# Patient Record
Sex: FEMALE | Race: ASIAN | NOT HISPANIC OR LATINO | URBAN - METROPOLITAN AREA
[De-identification: names, ages, dates, MRNs, and addresses within clinical notes are randomized per-mention and may not be internally consistent; named-entity substitution may affect disease eponyms.]

---

## 2019-03-23 ENCOUNTER — EMERGENCY (EMERGENCY)
Facility: HOSPITAL | Age: 59
LOS: 1 days | Discharge: HOME | End: 2019-03-23
Attending: EMERGENCY MEDICINE | Admitting: EMERGENCY MEDICINE
Payer: MEDICAID

## 2019-03-23 VITALS
RESPIRATION RATE: 16 BRPM | HEART RATE: 69 BPM | SYSTOLIC BLOOD PRESSURE: 143 MMHG | OXYGEN SATURATION: 99 % | DIASTOLIC BLOOD PRESSURE: 94 MMHG

## 2019-03-23 VITALS
DIASTOLIC BLOOD PRESSURE: 92 MMHG | HEART RATE: 70 BPM | RESPIRATION RATE: 18 BRPM | SYSTOLIC BLOOD PRESSURE: 146 MMHG | OXYGEN SATURATION: 99 % | TEMPERATURE: 98 F

## 2019-03-23 LAB
ALBUMIN SERPL ELPH-MCNC: 4.2 G/DL — SIGNIFICANT CHANGE UP (ref 3.3–5)
ALP SERPL-CCNC: 103 U/L — SIGNIFICANT CHANGE UP (ref 40–120)
ALT FLD-CCNC: 25 U/L — SIGNIFICANT CHANGE UP (ref 4–33)
ANION GAP SERPL CALC-SCNC: 11 MMO/L — SIGNIFICANT CHANGE UP (ref 7–14)
AST SERPL-CCNC: 36 U/L — HIGH (ref 4–32)
BASOPHILS # BLD AUTO: 0.05 K/UL — SIGNIFICANT CHANGE UP (ref 0–0.2)
BASOPHILS NFR BLD AUTO: 0.6 % — SIGNIFICANT CHANGE UP (ref 0–2)
BILIRUB SERPL-MCNC: 0.4 MG/DL — SIGNIFICANT CHANGE UP (ref 0.2–1.2)
BUN SERPL-MCNC: 9 MG/DL — SIGNIFICANT CHANGE UP (ref 7–23)
CALCIUM SERPL-MCNC: 11.3 MG/DL — HIGH (ref 8.4–10.5)
CHLORIDE SERPL-SCNC: 105 MMOL/L — SIGNIFICANT CHANGE UP (ref 98–107)
CO2 SERPL-SCNC: 21 MMOL/L — LOW (ref 22–31)
CREAT SERPL-MCNC: 0.48 MG/DL — LOW (ref 0.5–1.3)
EOSINOPHIL # BLD AUTO: 0.06 K/UL — SIGNIFICANT CHANGE UP (ref 0–0.5)
EOSINOPHIL NFR BLD AUTO: 0.7 % — SIGNIFICANT CHANGE UP (ref 0–6)
GLUCOSE SERPL-MCNC: 98 MG/DL — SIGNIFICANT CHANGE UP (ref 70–99)
HCT VFR BLD CALC: 42.1 % — SIGNIFICANT CHANGE UP (ref 34.5–45)
HGB BLD-MCNC: 14.1 G/DL — SIGNIFICANT CHANGE UP (ref 11.5–15.5)
IMM GRANULOCYTES NFR BLD AUTO: 0.2 % — SIGNIFICANT CHANGE UP (ref 0–1.5)
LIDOCAIN IGE QN: 14.6 U/L — SIGNIFICANT CHANGE UP (ref 7–60)
LYMPHOCYTES # BLD AUTO: 1.67 K/UL — SIGNIFICANT CHANGE UP (ref 1–3.3)
LYMPHOCYTES # BLD AUTO: 18.9 % — SIGNIFICANT CHANGE UP (ref 13–44)
MCHC RBC-ENTMCNC: 27.1 PG — SIGNIFICANT CHANGE UP (ref 27–34)
MCHC RBC-ENTMCNC: 33.5 % — SIGNIFICANT CHANGE UP (ref 32–36)
MCV RBC AUTO: 81 FL — SIGNIFICANT CHANGE UP (ref 80–100)
MONOCYTES # BLD AUTO: 0.33 K/UL — SIGNIFICANT CHANGE UP (ref 0–0.9)
MONOCYTES NFR BLD AUTO: 3.7 % — SIGNIFICANT CHANGE UP (ref 2–14)
NEUTROPHILS # BLD AUTO: 6.69 K/UL — SIGNIFICANT CHANGE UP (ref 1.8–7.4)
NEUTROPHILS NFR BLD AUTO: 75.9 % — SIGNIFICANT CHANGE UP (ref 43–77)
NRBC # FLD: 0 K/UL — LOW (ref 25–125)
PLATELET # BLD AUTO: 391 K/UL — SIGNIFICANT CHANGE UP (ref 150–400)
PMV BLD: 10.8 FL — SIGNIFICANT CHANGE UP (ref 7–13)
POTASSIUM SERPL-MCNC: 5 MMOL/L — SIGNIFICANT CHANGE UP (ref 3.5–5.3)
POTASSIUM SERPL-SCNC: 5 MMOL/L — SIGNIFICANT CHANGE UP (ref 3.5–5.3)
PROT SERPL-MCNC: 8.4 G/DL — HIGH (ref 6–8.3)
RBC # BLD: 5.2 M/UL — SIGNIFICANT CHANGE UP (ref 3.8–5.2)
RBC # FLD: 13.3 % — SIGNIFICANT CHANGE UP (ref 10.3–14.5)
SODIUM SERPL-SCNC: 137 MMOL/L — SIGNIFICANT CHANGE UP (ref 135–145)
TROPONIN T, HIGH SENSITIVITY: < 6 NG/L — SIGNIFICANT CHANGE UP (ref ?–14)
TSH SERPL-MCNC: 0.25 UIU/ML — LOW (ref 0.27–4.2)
WBC # BLD: 8.82 K/UL — SIGNIFICANT CHANGE UP (ref 3.8–10.5)
WBC # FLD AUTO: 8.82 K/UL — SIGNIFICANT CHANGE UP (ref 3.8–10.5)

## 2019-03-23 PROCEDURE — 93010 ELECTROCARDIOGRAM REPORT: CPT

## 2019-03-23 PROCEDURE — 99284 EMERGENCY DEPT VISIT MOD MDM: CPT | Mod: 25

## 2019-03-23 RX ORDER — MECLIZINE HCL 12.5 MG
25 TABLET ORAL ONCE
Qty: 0 | Refills: 0 | Status: COMPLETED | OUTPATIENT
Start: 2019-03-23 | End: 2019-03-23

## 2019-03-23 RX ORDER — METOCLOPRAMIDE HCL 10 MG
1 TABLET ORAL
Qty: 16 | Refills: 0 | OUTPATIENT
Start: 2019-03-23 | End: 2019-03-26

## 2019-03-23 RX ORDER — METOCLOPRAMIDE HCL 10 MG
10 TABLET ORAL ONCE
Qty: 0 | Refills: 0 | Status: COMPLETED | OUTPATIENT
Start: 2019-03-23 | End: 2019-03-23

## 2019-03-23 RX ORDER — SODIUM CHLORIDE 9 MG/ML
2000 INJECTION INTRAMUSCULAR; INTRAVENOUS; SUBCUTANEOUS ONCE
Qty: 0 | Refills: 0 | Status: COMPLETED | OUTPATIENT
Start: 2019-03-23 | End: 2019-03-23

## 2019-03-23 RX ORDER — MECLIZINE HCL 12.5 MG
12.5 TABLET ORAL ONCE
Qty: 0 | Refills: 0 | Status: DISCONTINUED | OUTPATIENT
Start: 2019-03-23 | End: 2019-03-23

## 2019-03-23 RX ADMIN — Medication 10 MILLIGRAM(S): at 18:40

## 2019-03-23 RX ADMIN — SODIUM CHLORIDE 2000 MILLILITER(S): 9 INJECTION INTRAMUSCULAR; INTRAVENOUS; SUBCUTANEOUS at 18:41

## 2019-03-23 RX ADMIN — Medication 25 MILLIGRAM(S): at 18:40

## 2019-03-23 NOTE — ED PROVIDER NOTE - CLINICAL SUMMARY MEDICAL DECISION MAKING FREE TEXT BOX
60 Y/O F denies PMH woke up with room spinning dizz today, denies trauma, pt neurologically intact on exam. Plan is labs, fluid, antivert and reglan and reassessment. Pt is stable and well appearing. 60 Y/O F denies PMH woke up with room spinning dizz today, denies trauma, pt neurologically intact on exam. Plan is labs, fluid, antivert and reglan and reassessment. Pt is stable and well appearing. Pt has exophthalmus on exam, will give endocrine f/u for possible graves disease.

## 2019-03-23 NOTE — ED PROVIDER NOTE - PROGRESS NOTE DETAILS
DESIRAE Bojorquez: Pt reassessed, able to ambulate with a steady gait, states she feels better pt told to see pmd and endocrine to check thyroid function.

## 2019-03-23 NOTE — ED PROVIDER NOTE - NSFOLLOWUPINSTRUCTIONS_ED_ALL_ED_FT
Follow up with your primary doctor, continue to take meclizine as prescribed and take reglan as needed for nausea. Some of your labs are abnormal. Bring this packet which includes your results. Your calcium is high and your thyroid function is high. You may have Graves disease, have your primary doctor do a T4 and T3 outpatient. Call  to schedule an appointment. Rest, drink plenty of fluids.  Advance activity as tolerated.  Continue all previously prescribed medications as directed.  Follow up with your primary care physician in 48-72 hours- bring copies of your results.  Return to the ER for worsening or persistent symptoms, and/or ANY NEW OR CONCERNING SYMPTOMS. If you have issues obtaining follow up, please call: [phone] to obtain a doctor or specialist who takes your insurance in your area.  You may call [phone] to make an appointment with the internal medicine clinic. Follow up with your primary doctor, continue to take meclizine as prescribed and take reglan as needed for nausea. Some of your labs are abnormal. Bring this packet which includes your results. Your calcium is high and your thyroid function is high. You may have Graves disease, have your primary doctor do a T4 and T3 outpatient. Call  to schedule an appointment with an endocrinologist. Rest, drink plenty of fluids.  Advance activity as tolerated.  Continue all previously prescribed medications as directed.  Follow up with your primary care physician in 48-72 hours- bring copies of your results.  Return to the ER for worsening or persistent symptoms, and/or ANY NEW OR CONCERNING SYMPTOMS. If you have issues obtaining follow up, please call: [phone] to obtain a doctor or specialist who takes your insurance in your area.  You may call [phone] to make an appointment with the internal medicine clinic.

## 2019-03-23 NOTE — ED PROVIDER NOTE - CRANIAL NERVE AND PUPILLARY EXAM
cranial nerves 2-12 intact/central and peripheral vision intact/tongue is midline/peripheral vision intact/extra-ocular movements intact

## 2019-03-23 NOTE — ED ADULT NURSE NOTE - OBJECTIVE STATEMENT
pt alert,oriented x3. family member at bedside to translate. pt Luxem[place]ish speaking. c/o dizziness, denies n,pain at present

## 2019-03-23 NOTE — ED PROVIDER NOTE - OBJECTIVE STATEMENT
60 Y/O F only PMH vertigo states she woke up with room spinning dizz and nausea this morning. 58 Y/O F only PMH vertigo states she woke up with room spinning dizz and nausea this morning. She states she took meclizine but vomited shortly afterward which was non bloody non billious. She denies numbness weakness tingling visual changes or inability to ambulate, denies fever chills nightsweats CP SOB ABD PN N V D DIzz Diarrhea or any other symptoms or complaints. She is selwyn speaking, was offered a  phone but declined, son in room for translation. States symptoms are worse when she moves her head.

## 2019-03-23 NOTE — ED PROVIDER NOTE - ATTENDING CONTRIBUTION TO CARE
DR. GLEZ, ATTENDING MD-  I performed a face to face bedside interview with patient regarding history of present illness, review of symptoms and past medical history. I completed an independent physical exam.  I have discussed patient's plan of care with PA.   Documentation as above in the note.    60 y/o female with h/o vertigo typically controlled with meclizine here with vertigo.  Pt states that she had dizziness this morning which is intermittent.  Severe sx.  Took meclizine she had at home which typically works but did little to alleviate her sx.  Worse with head movements or changing positions.  Denies f/c, ha, neck stiffness, cp, sob, cough, abd pain, n/v/d, dysuria, rash.  Afebrile, vs wnl, nad, perrla, eomi, no nystagmus, ctabil, s1s2 rrr no m/r/g, abd soft non ttp no r/g, no cva tenderness b/l, no leg swelling b/l, no rash, motor 5/5 x4 ext, distal sensory grossly intact x4 ext, ambulatory with assist, normal heel to shin, normal finger to nose.  Doubt central vertigo given rapidity of sx onset, severity of sx.  Attempt symptom control.  Obtain cbc bmp give ivf meclizine reassess.

## 2019-03-23 NOTE — ED PROVIDER NOTE - ENMT, MLM
Airway patent, Nasal mucosa clear. Mouth with normal mucosa. Throat has no vesicles, no oropharyngeal exudates and uvula is midline, no nystagmus.

## 2019-03-23 NOTE — ED PROVIDER NOTE - CARE PLAN
Principal Discharge DX:	Vertigo Principal Discharge DX:	Vertigo  Secondary Diagnosis:	Hyperthyroidism

## 2019-03-23 NOTE — ED ADULT TRIAGE NOTE - CHIEF COMPLAINT QUOTE
Patient has c/o dizziness that started this morning with an episode of vomiting. Pt has a history of Vertigo.

## 2021-03-09 NOTE — ED ADULT NURSE NOTE - NSSUSCREENINGQ3_ED_ALL_ED
SPINE EVALUATION:   Referring Physician: Dr. Sofie Dickey  Diagnosis: Strain of lumbar region, initial encounter (S39.012A)  Strain of trapezius muscle, unspecified laterality, initial encounter (X49.473M)     Date of Service: 3/9/2021     PATIENT SUMMARY   Rosio Bolanos BMI of 45.0-49.9    ASSESSMENT  Alona Mckeon presents to physical therapy evaluation with primary c/o back pain and neck pain with tingling into the left hand.  The results of the objective tests and measures show cervical AROM that is WNL, impaired mobility of Trap: R impaired; L impaired  Levator Scap: R impaired; L impaired     Special tests:   ULTT to be assessed at next session    Gait: pt ambulates on level ground with normal mechanics.     Today’s Treatment and Response:   Pt education was provided on exam and Home Exercise Program instruction    Education or treatment limitation: None  Rehab Potential:good    FOTO: 71/100    Patient/Family/Caregiver was advised of these findings, precautions, and treatment options and has agreed to actively participate in p No

## 2022-08-11 PROBLEM — R42 DIZZINESS AND GIDDINESS: Chronic | Status: ACTIVE | Noted: 2019-03-23

## 2022-08-11 PROBLEM — Z00.00 ENCOUNTER FOR PREVENTIVE HEALTH EXAMINATION: Status: ACTIVE | Noted: 2022-08-11

## 2022-08-12 ENCOUNTER — NON-APPOINTMENT (OUTPATIENT)
Age: 62
End: 2022-08-12

## 2022-08-12 ENCOUNTER — APPOINTMENT (OUTPATIENT)
Dept: ORTHOPEDIC SURGERY | Facility: CLINIC | Age: 62
End: 2022-08-12

## 2022-08-12 VITALS
BODY MASS INDEX: 27.97 KG/M2 | OXYGEN SATURATION: 95 % | WEIGHT: 152 LBS | DIASTOLIC BLOOD PRESSURE: 72 MMHG | HEIGHT: 62 IN | HEART RATE: 67 BPM | SYSTOLIC BLOOD PRESSURE: 134 MMHG

## 2022-08-12 PROCEDURE — 99204 OFFICE O/P NEW MOD 45 MIN: CPT | Mod: 25

## 2022-08-12 PROCEDURE — 73110 X-RAY EXAM OF WRIST: CPT | Mod: RT

## 2022-08-12 PROCEDURE — 29075 APPL CST ELBW FNGR SHORT ARM: CPT | Mod: RT

## 2022-08-12 RX ORDER — IBUPROFEN 600 MG/1
600 TABLET, FILM COATED ORAL 3 TIMES DAILY
Qty: 90 | Refills: 1 | Status: ACTIVE | COMMUNITY
Start: 2022-08-12 | End: 1900-01-01

## 2022-08-24 ENCOUNTER — APPOINTMENT (OUTPATIENT)
Dept: ORTHOPEDIC SURGERY | Facility: CLINIC | Age: 62
End: 2022-08-24

## 2022-08-24 PROCEDURE — 99213 OFFICE O/P EST LOW 20 MIN: CPT | Mod: 25

## 2022-08-24 PROCEDURE — 29075 APPL CST ELBW FNGR SHORT ARM: CPT | Mod: RT

## 2022-08-24 PROCEDURE — 73110 X-RAY EXAM OF WRIST: CPT | Mod: 26,RT

## 2022-09-12 ENCOUNTER — APPOINTMENT (OUTPATIENT)
Dept: ORTHOPEDIC SURGERY | Facility: CLINIC | Age: 62
End: 2022-09-12

## 2022-09-12 PROCEDURE — 73110 X-RAY EXAM OF WRIST: CPT | Mod: RT

## 2022-09-12 PROCEDURE — 99213 OFFICE O/P EST LOW 20 MIN: CPT

## 2022-09-23 ENCOUNTER — APPOINTMENT (OUTPATIENT)
Dept: ORTHOPEDIC SURGERY | Facility: CLINIC | Age: 62
End: 2022-09-23

## 2022-09-23 DIAGNOSIS — S52.514D NONDISPLACED FRACTURE OF RIGHT RADIAL STYLOID PROCESS, SUBSEQUENT ENCOUNTER FOR CLOSED FRACTURE WITH ROUTINE HEALING: ICD-10-CM

## 2022-09-23 PROCEDURE — 73110 X-RAY EXAM OF WRIST: CPT | Mod: RT

## 2022-09-23 PROCEDURE — 99214 OFFICE O/P EST MOD 30 MIN: CPT

## 2022-09-23 RX ORDER — MELOXICAM 15 MG/1
15 TABLET ORAL
Qty: 30 | Refills: 0 | Status: ACTIVE | COMMUNITY
Start: 2022-09-23 | End: 1900-01-01

## 2023-07-04 ENCOUNTER — EMERGENCY (EMERGENCY)
Facility: HOSPITAL | Age: 63
LOS: 1 days | Discharge: HOME | End: 2023-07-04
Attending: STUDENT IN AN ORGANIZED HEALTH CARE EDUCATION/TRAINING PROGRAM | Admitting: EMERGENCY MEDICINE
Payer: MEDICAID

## 2023-07-04 VITALS
OXYGEN SATURATION: 95 % | HEART RATE: 84 BPM | SYSTOLIC BLOOD PRESSURE: 142 MMHG | TEMPERATURE: 98 F | RESPIRATION RATE: 16 BRPM | DIASTOLIC BLOOD PRESSURE: 89 MMHG

## 2023-07-04 LAB
ALBUMIN SERPL ELPH-MCNC: 4.6 G/DL — SIGNIFICANT CHANGE UP (ref 3.3–5)
ALP SERPL-CCNC: 136 U/L — HIGH (ref 40–120)
ALT FLD-CCNC: 26 U/L — SIGNIFICANT CHANGE UP (ref 4–33)
ANION GAP SERPL CALC-SCNC: 11 MMOL/L — SIGNIFICANT CHANGE UP (ref 7–14)
APTT BLD: 34.6 SEC — SIGNIFICANT CHANGE UP (ref 27–36.3)
AST SERPL-CCNC: 27 U/L — SIGNIFICANT CHANGE UP (ref 4–32)
BASOPHILS # BLD AUTO: 0.06 K/UL — SIGNIFICANT CHANGE UP (ref 0–0.2)
BASOPHILS NFR BLD AUTO: 0.8 % — SIGNIFICANT CHANGE UP (ref 0–2)
BILIRUB SERPL-MCNC: 0.2 MG/DL — SIGNIFICANT CHANGE UP (ref 0.2–1.2)
BUN SERPL-MCNC: 11 MG/DL — SIGNIFICANT CHANGE UP (ref 7–23)
CALCIUM SERPL-MCNC: 12 MG/DL — HIGH (ref 8.4–10.5)
CHLORIDE SERPL-SCNC: 105 MMOL/L — SIGNIFICANT CHANGE UP (ref 98–107)
CO2 SERPL-SCNC: 23 MMOL/L — SIGNIFICANT CHANGE UP (ref 22–31)
CREAT SERPL-MCNC: 0.53 MG/DL — SIGNIFICANT CHANGE UP (ref 0.5–1.3)
EGFR: 103 ML/MIN/1.73M2 — SIGNIFICANT CHANGE UP
EOSINOPHIL # BLD AUTO: 0.25 K/UL — SIGNIFICANT CHANGE UP (ref 0–0.5)
EOSINOPHIL NFR BLD AUTO: 3.4 % — SIGNIFICANT CHANGE UP (ref 0–6)
GLUCOSE SERPL-MCNC: 97 MG/DL — SIGNIFICANT CHANGE UP (ref 70–99)
HCT VFR BLD CALC: 40.8 % — SIGNIFICANT CHANGE UP (ref 34.5–45)
HGB BLD-MCNC: 13.6 G/DL — SIGNIFICANT CHANGE UP (ref 11.5–15.5)
IANC: 4.19 K/UL — SIGNIFICANT CHANGE UP (ref 1.8–7.4)
IMM GRANULOCYTES NFR BLD AUTO: 0.3 % — SIGNIFICANT CHANGE UP (ref 0–0.9)
INR BLD: 1.01 RATIO — SIGNIFICANT CHANGE UP (ref 0.88–1.16)
LYMPHOCYTES # BLD AUTO: 2.32 K/UL — SIGNIFICANT CHANGE UP (ref 1–3.3)
LYMPHOCYTES # BLD AUTO: 31.8 % — SIGNIFICANT CHANGE UP (ref 13–44)
MCHC RBC-ENTMCNC: 27.6 PG — SIGNIFICANT CHANGE UP (ref 27–34)
MCHC RBC-ENTMCNC: 33.3 GM/DL — SIGNIFICANT CHANGE UP (ref 32–36)
MCV RBC AUTO: 82.8 FL — SIGNIFICANT CHANGE UP (ref 80–100)
MONOCYTES # BLD AUTO: 0.45 K/UL — SIGNIFICANT CHANGE UP (ref 0–0.9)
MONOCYTES NFR BLD AUTO: 6.2 % — SIGNIFICANT CHANGE UP (ref 2–14)
NEUTROPHILS # BLD AUTO: 4.19 K/UL — SIGNIFICANT CHANGE UP (ref 1.8–7.4)
NEUTROPHILS NFR BLD AUTO: 57.5 % — SIGNIFICANT CHANGE UP (ref 43–77)
NRBC # BLD: 0 /100 WBCS — SIGNIFICANT CHANGE UP (ref 0–0)
NRBC # FLD: 0 K/UL — SIGNIFICANT CHANGE UP (ref 0–0)
PLATELET # BLD AUTO: 366 K/UL — SIGNIFICANT CHANGE UP (ref 150–400)
POTASSIUM SERPL-MCNC: 4.3 MMOL/L — SIGNIFICANT CHANGE UP (ref 3.5–5.3)
POTASSIUM SERPL-SCNC: 4.3 MMOL/L — SIGNIFICANT CHANGE UP (ref 3.5–5.3)
PROT SERPL-MCNC: 8.7 G/DL — HIGH (ref 6–8.3)
PROTHROM AB SERPL-ACNC: 11.7 SEC — SIGNIFICANT CHANGE UP (ref 10.5–13.4)
RBC # BLD: 4.93 M/UL — SIGNIFICANT CHANGE UP (ref 3.8–5.2)
RBC # FLD: 13.2 % — SIGNIFICANT CHANGE UP (ref 10.3–14.5)
SODIUM SERPL-SCNC: 139 MMOL/L — SIGNIFICANT CHANGE UP (ref 135–145)
T4 FREE SERPL-MCNC: 1.3 NG/DL — SIGNIFICANT CHANGE UP (ref 0.9–1.8)
TROPONIN T, HIGH SENSITIVITY RESULT: <6 NG/L — SIGNIFICANT CHANGE UP
TSH SERPL-MCNC: 0.18 UIU/ML — LOW (ref 0.27–4.2)
WBC # BLD: 7.29 K/UL — SIGNIFICANT CHANGE UP (ref 3.8–10.5)
WBC # FLD AUTO: 7.29 K/UL — SIGNIFICANT CHANGE UP (ref 3.8–10.5)

## 2023-07-04 PROCEDURE — 99291 CRITICAL CARE FIRST HOUR: CPT

## 2023-07-04 PROCEDURE — 70498 CT ANGIOGRAPHY NECK: CPT | Mod: 26,MA

## 2023-07-04 PROCEDURE — 70496 CT ANGIOGRAPHY HEAD: CPT | Mod: 26,MA

## 2023-07-04 PROCEDURE — 93010 ELECTROCARDIOGRAM REPORT: CPT

## 2023-07-04 PROCEDURE — 0042T: CPT | Mod: MA

## 2023-07-04 PROCEDURE — 99223 1ST HOSP IP/OBS HIGH 75: CPT

## 2023-07-04 RX ORDER — CLOPIDOGREL BISULFATE 75 MG/1
75 TABLET, FILM COATED ORAL DAILY
Refills: 0 | Status: DISCONTINUED | OUTPATIENT
Start: 2023-07-05 | End: 2023-07-08

## 2023-07-04 RX ORDER — ASPIRIN/CALCIUM CARB/MAGNESIUM 324 MG
81 TABLET ORAL DAILY
Refills: 0 | Status: DISCONTINUED | OUTPATIENT
Start: 2023-07-05 | End: 2023-07-08

## 2023-07-04 RX ORDER — SODIUM CHLORIDE 9 MG/ML
1000 INJECTION INTRAMUSCULAR; INTRAVENOUS; SUBCUTANEOUS ONCE
Refills: 0 | Status: COMPLETED | OUTPATIENT
Start: 2023-07-04 | End: 2023-07-04

## 2023-07-04 RX ORDER — SODIUM CHLORIDE 9 MG/ML
1000 INJECTION INTRAMUSCULAR; INTRAVENOUS; SUBCUTANEOUS
Refills: 0 | Status: DISCONTINUED | OUTPATIENT
Start: 2023-07-04 | End: 2023-07-08

## 2023-07-04 RX ORDER — ATORVASTATIN CALCIUM 80 MG/1
80 TABLET, FILM COATED ORAL AT BEDTIME
Refills: 0 | Status: DISCONTINUED | OUTPATIENT
Start: 2023-07-04 | End: 2023-07-08

## 2023-07-04 RX ORDER — ASPIRIN/CALCIUM CARB/MAGNESIUM 324 MG
324 TABLET ORAL ONCE
Refills: 0 | Status: COMPLETED | OUTPATIENT
Start: 2023-07-04 | End: 2023-07-04

## 2023-07-04 RX ORDER — CLOPIDOGREL BISULFATE 75 MG/1
300 TABLET, FILM COATED ORAL ONCE
Refills: 0 | Status: COMPLETED | OUTPATIENT
Start: 2023-07-04 | End: 2023-07-04

## 2023-07-04 RX ADMIN — SODIUM CHLORIDE 1000 MILLILITER(S): 9 INJECTION INTRAMUSCULAR; INTRAVENOUS; SUBCUTANEOUS at 18:16

## 2023-07-04 RX ADMIN — SODIUM CHLORIDE 150 MILLILITER(S): 9 INJECTION INTRAMUSCULAR; INTRAVENOUS; SUBCUTANEOUS at 20:47

## 2023-07-04 RX ADMIN — ATORVASTATIN CALCIUM 80 MILLIGRAM(S): 80 TABLET, FILM COATED ORAL at 19:02

## 2023-07-04 RX ADMIN — CLOPIDOGREL BISULFATE 300 MILLIGRAM(S): 75 TABLET, FILM COATED ORAL at 19:02

## 2023-07-04 RX ADMIN — Medication 324 MILLIGRAM(S): at 19:02

## 2023-07-04 NOTE — ED ADULT TRIAGE NOTE - CHIEF COMPLAINT QUOTE
Pt. c/o right eye blurry vision starting around 4pm. Denies headache, dizziness, nausea, vomiting, weakness or eye pain. No other neuro deficits noted on exam. h/o migraines but never had blurry vision before.

## 2023-07-04 NOTE — ED PROVIDER NOTE - PROGRESS NOTE DETAILS
Juve Luna MD PGY-2  CT with c/f L occipital hypodensity per neurology team. C/F CVA w/o candidacy for tenecteplase.   - Ophtho still to see  - TTE,   - ASA load then daily  - Clopidogrel load then daily    #Hypercalcemia  Pt with hypecalcemia noted as well.    - NS 1L IVB, then IVF 150cc/hr  - PTH, PTHrP, VitD 25, VitD1,25, TSH w/ T4

## 2023-07-04 NOTE — CONSULT NOTE ADULT - ASSESSMENT
Assessment and Recommendations:  63y female with no known past medical history or ocular history consulted for evaluation of sudden onset blurry vision right eye x3 hours.     #Blurry Vision OD       Outpatient Follow-up: Patient should follow-up with his/her ophthalmologist or with [facility] Department of Ophthalmology within 1 week of after discharge at:    [address]  [place] [address] [phone]    Deepika Calvin MD PGY 3  Available on [employer] Teams Assessment and Recommendations:  63y female with no known past medical history or ocular history consulted for evaluation of sudden onset blurry vision right eye x3 hours.     #Blurry Vision OD  - Va CF PH 20/400 OD, 20/20 OS   - PERRL no rAPD  - CVF with temporal constriction OD, grossly full OS  - DFE without disc margin blurring. No retinal whitening noted. Vessels ess wnl.   - Stroke team noted hypodensity in left occipital lobe concerning for occipital stroke - symptoms fit this distribution  - GCA ROS negative, temporal pulses full - low suspicion for GCA  - Appreciate neurology care: agree with complete stroke work-up and management  - Agree with MRI brain w/ IV contrast, please add on MRI orbits w/ contrast to rule out orbital pathology - call ophthalmology if any orbital pathology    #Ocular HTN/Glc Suspect  - IOP 21 OD, 24 OS  - CDR 0.45, 0.3 - asymmetric appearing  - Iris also bowed forward  - Will need further glaucoma screening as an outpatient       Outpatient Follow-up: Patient should follow-up with his/her ophthalmologist or with [facility] Department of Ophthalmology within 1 week of after discharge at:    [address]  [place] [address]  [phone]    Deepika Calvin MD PGY 3  Available on [employer] Teams Assessment and Recommendations:  63y female with no known past medical history or ocular history consulted for evaluation of sudden onset blurry vision right eye x3 hours.     #Blurry Vision OD  - Va CF PH 20/400 OD, 20/20 OS   - PERRL no rAPD  - CVF with temporal constriction OD, grossly full OS  - DFE without disc margin blurring. No retinal whitening noted. Vessels ess wnl.   - Stroke team noted hypodensity in left occipital lobe concerning for occipital stroke - symptoms fit this distribution  - GCA ROS negative, temporal pulses full - low suspicion for GCA  - Appreciate neurology care: agree with complete stroke work-up and management  - Agree with MRI brain w/ IV contrast, please add on MRI orbits w/ contrast to rule out orbital pathology - call ophthalmology if any orbital pathology    #Ocular HTN/Glc Suspect  - IOP 21 OD, 24 OS  - CDR 0.45, 0.3 - asymmetric appearing  - Iris also bowed forward  - Will need further glaucoma screening as an outpatient     INCOMPLETE      Outpatient Follow-up: Patient should follow-up with his/her ophthalmologist or with [facility] Department of Ophthalmology within 1 week of after discharge at:    [address]  [place] [address]  [phone]    Deepika Calvin MD PGY 3  Available on [employer] Teams Assessment and Recommendations:  63y female with no known past medical history or ocular history consulted for evaluation of sudden onset blurry vision right eye x3 hours.     #Blurry Vision OD  - Va CF PH 20/400 OD, 20/20 OS   - PERRL no rAPD  - CVF with temporal constriction OD, grossly full OS  - DFE without disc margin blurring.   - Stroke team noted hypodensity in left occipital lobe concerning for occipital stroke - symptoms fit this distribution  - GCA ROS negative, temporal pulses full - low suspicion for GCA  - Appreciate neurology care: agree with complete stroke work-up and management  - Agree with MRI brain w/ IV contrast, please add on MRI orbits w/ contrast to rule out orbital pathology - call ophthalmology if any orbital pathology    #Ocular HTN/Glc Suspect  - IOP 21 OD, 24 OS  - CDR 0.45, 0.3 - asymmetric appearing  - Iris also bowed forward  - Will need further glaucoma screening as an outpatient     INCOMPLETE      Outpatient Follow-up: Patient should follow-up with his/her ophthalmologist or with [facility] Department of Ophthalmology within 1 week of after discharge at:    [address]  [place], [place] [address] [phone]    Deepika Calvin MD PGY 3  Available on [employer] Teams Assessment and Recommendations:  63y female with no known past medical history or ocular history consulted for evaluation of sudden onset blurry vision right eye x3 hours.     #Blurry Vision OD  - Va CF PH 20/400 OD, 20/20 OS   - PERRL no rAPD  - CVF with temporal constriction OD, grossly full OS  - DFE without disc margin blurring. Some retinal pallor noted OD compared to OS. No obvious hollenhorst plaques OD - concern for retinal artery occlusion  - Stroke team noted hypodensity in left occipital lobe concerning for occipital stroke  - GCA ROS negative, temporal pulses full - low suspicion for GCA  - Given profound vision loss and mild retinal pallor, high suspicion for retinal artery occlusion - Pt is in window for intra-arterial tPA. Will discuss with neurology the risks and benefits.   - Appreciate neurology care  - Agree with MRI brain w/ IV contrast, please add on MRI orbits w/ contrast to rule out orbital pathology - call ophthalmology if any orbital pathology    #Ocular HTN/Glc Suspect  - IOP 21 OD, 24 OS  - CDR 0.45, 0.3 - asymmetric appearing  - Iris also bowed forward  - Will need further glaucoma screening as an outpatient     INCOMPLETE      Outpatient Follow-up: Patient should follow-up with his/her ophthalmologist or with [facility] Department of Ophthalmology within 1 week of after discharge at:    [address]  [place] [address]  [phone]    Deepika Calvin MD PGY 3  Available on [employer] Teams Assessment and Recommendations:  63y female with no known past medical history or ocular history consulted for evaluation of sudden onset blurry vision right eye x3 hours.     #Blurry Vision OD  - Va CF PH 20/400 OD, 20/20 OS   - PERRL no rAPD  - CVF with temporal constriction OD, grossly full OS  - DFE without disc margin blurring. Some retinal pallor noted OD compared to OS. No obvious hollenhorst plaques OD - concern for retinal artery occlusion  - Stroke team noted hypodensity in left occipital lobe concerning for occipital stroke  - GCA ROS negative, temporal pulses full - low suspicion for GCA  - Given profound vision loss and mild retinal pallor, higher suspicion for retinal vascular event. May be early stages of retinal artery occlusion - Pt is in window for intra-arterial tPA. Will discuss with neurology.    - Appreciate neurology care  - Agree with MRI brain w/ IV contrast, please add on MRI orbits w/ contrast to rule out orbital pathology - call ophthalmology if any orbital pathology    #Ocular HTN/Glc Suspect  - IOP 21 OD, 24 OS  - CDR 0.45, 0.3 - asymmetric appearing  - Iris also bowed forward  - Will need further glaucoma screening as an outpatient     INCOMPLETE      Outpatient Follow-up: Patient should follow-up with his/her ophthalmologist or with [facility] Department of Ophthalmology within 1 week of after discharge at:    [address]  [place] [address]  [phone]    Deepika Calvin MD PGY 3  Available on [employer] Teams Assessment and Recommendations:  63y female with no known past medical history or ocular history consulted for evaluation of sudden onset blurry vision right eye x3 hours.     #Blurry Vision OD  - Va CF PH 20/400 OD, 20/20 OS   - PERRL no rAPD  - CVF with temporal constriction OD, grossly full OS  - DFE without disc margin blurring. Some retinal pallor noted OD compared to OS. No obvious hollenhorst plaques OD - concern for retinal artery occlusion  - Stroke team noted hypodensity in left occipital lobe concerning for occipital stroke  - GCA ROS negative, temporal pulses full - low suspicion for GCA  - Given profound vision loss and mild retinal pallor, higher suspicion for retinal vascular event. May be early stages of retinal artery occlusion - Pt is in window for intra-arterial tPA. Will discuss with neurology.    - Appreciate neurology care  - Agree with MRI brain w/ IV contrast, please add on MRI orbits w/ contrast to rule out orbital pathology - call ophthalmology if any orbital pathology    #Ocular HTN/Glc Suspect  - IOP 21 OD, 24 OS  - CDR 0.45, 0.3 - asymmetric appearing  - Iris also bowed forward  - Will need further glaucoma screening as an outpatient       Outpatient Follow-up: Patient should follow-up with his/her ophthalmologist or with [facility] Department of Ophthalmology within 1 week of after discharge at:    [address]  [place] [address]  [phone]    Deepika Calvin MD PGY 3  Available on [employer] Teams Assessment and Recommendations:  63y female with no known past medical history or ocular history consulted for evaluation of sudden onset blurry vision right eye x3 hours.     #Blurry Vision OD  - Va CF PH 20/400 OD, 20/20 OS   - PERRL no rAPD  - CVF with temporal constriction OD, grossly full OS  - DFE without disc margin blurring. Some retinal pallor noted OD compared to OS. No obvious hollenhorst plaques OD - concern for retinal artery occlusion  - Stroke team noted hypodensity in left occipital lobe concerning for occipital stroke  - GCA ROS negative, temporal pulses full - low suspicion for GCA  - Given profound vision loss and mild retinal pallor, higher suspicion for retinal vascular event. May be early stages of retinal artery occlusion - Pt is in window for intra-arterial tPA. Will discuss with neurology.    - Appreciate neurology care  - Agree with MRI brain w/ IV contrast, please add on MRI orbits w/ contrast to rule out orbital pathology - call ophthalmology if any orbital pathology    #Ocular HTN/Glc Suspect  - IOP 21 OD, 24 OS  - CDR 0.45, 0.3 - asymmetric appearing  - Iris also bowed forward  - Will need further glaucoma screening as an outpatient     DW Dr. Yung, chief, DW Dr. Dalal, neuroophthalmology      Outpatient Follow-up: Patient should follow-up with his/her ophthalmologist or with [facility] Department of Ophthalmology within 1 week of after discharge at:    [address]  [place] [address]  [phone]    Deepika Calvin MD PGY 3  Available on [employer] Teams Assessment and Recommendations:  63y female with no known past medical history or ocular history consulted for evaluation of sudden onset blurry vision right eye x3 hours.     #Blurry Vision OD  - Va CF PH 20/400 OD, 20/20 OS   - PERRL no rAPD  - CVF with temporal constriction OD, grossly full OS  - DFE without disc margin blurring. Some retinal pallor noted OD compared to OS. No obvious hollenhorst plaques OD - concern for retinal artery occlusion  - Stroke team noted hypodensity in left occipital lobe concerning for occipital stroke  - GCA ROS negative, temporal pulses full - low suspicion for GCA  - Given profound vision loss and mild retinal pallor, higher suspicion for retinal vascular event. May be early stages of retinal artery occlusion - Pt is in window for intra-arterial tPA. Will discuss with neurology.    - Appreciate neurology care  - Agree with MRI brain w/ IV contrast, please add on MRI orbits w/ contrast to rule out orbital pathology - call ophthalmology if any orbital pathology    #Ocular HTN/Glc Suspect  - IOP 21 OD, 24 OS  - CDR 0.55, 0.3 - asymmetric appearing  - Iris also bowed forward  - Will need further glaucoma screening as an outpatient     DW Dr. Yung, chief, DW Dr. Dalal, neuroophthalmology      Outpatient Follow-up: Patient should follow-up with his/her ophthalmologist or with [facility] Department of Ophthalmology within 1 week of after discharge at:    [address]  [place] [address]  [phone]    Deepika Calvin MD PGY 3  Available on [employer] Teams

## 2023-07-04 NOTE — ED PROVIDER NOTE - OBJECTIVE STATEMENT
63F with no known PMHx presents with sudden onset, painless, right eye blurry vision that started 30 minutes prior to arrival & evaluation. She     PCP: Dr. Parish  Pharm: [facility]

## 2023-07-04 NOTE — CONSULT NOTE ADULT - SUBJECTIVE AND OBJECTIVE BOX
Neurology - Consult Note    -  Spectra: [phone] ([facility]), [phone] ([facility])  -    HPI: Patient JESSI FLORES is a 63y (1960) woman with PMH Migraine Headache presenting as code stroke for right eye blurry vision. Patient reports around 4 pm she started to developed blurry vision. Denies headache, jaw claudication, temporal tenderness, weakness, numbness, changes to       Review of Systems:   All other review of systems is negative unless indicated above.    Allergies:  No Known Allergies      PMHx/PSHx/Family Hx: As above, otherwise see below   Vertigo        Social Hx:  No current use of tobacco, alcohol, or illicit drugs  Lives with ***    Medications:  MEDICATIONS  (STANDING):    MEDICATIONS  (PRN):      Vitals:  T(C): 36.9 (07-04-23 @ 16:30), Max: 36.9 (07-04-23 @ 16:30)  HR: 84 (07-04-23 @ 16:30) (84 - 84)  BP: 142/89 (07-04-23 @ 16:30) (142/89 - 142/89)  RR: 16 (07-04-23 @ 16:30) (16 - 16)  SpO2: 95% (07-04-23 @ 16:30) (95% - 95%)    Physical Examination: INCOMPLETE  General - NAD  Cardiovascular - Peripheral pulses palpable, no edema  Eyes - Fundoscopy with flat, sharp optic discs and no hemorrhage or exudates; Fundoscopy not well visualized; Fundoscopy not performed due to safety precautions in the setting of the COVID-19 pandemic    Neurologic Exam:  Mental status - Awake, Alert, Oriented to person, place, and time. Speech fluent, repetition and naming intact. Follows simple and complex commands. Attention/concentration, recent and remote memory (including registration and recall), and fund of knowledge intact    Cranial nerves - PERRLA, VFF, EOMI, face sensation (V1-V3) intact b/l, facial strength intact without asymmetry b/l, hearing intact b/l, palate with symmetric elevation, trapezius OR sternocleidomastiod 5/5 strength b/l, tongue midline on protrusion with full lateral movement    Motor - Normal bulk and tone throughout. No pronator drift.  Strength testing            Deltoid      Biceps      Triceps     Wrist Extension    Wrist Flexion     Interossei         R            5                 5               5                     5                              5                        5                 5  L             5                 5               5                     5                              5                        5                 5              Hip Flexion    Hip Extension    Knee Flexion    Knee Extension    Dorsiflexion    Plantar Flexion  R              5                           5                       5                           5                            5                          5  L              5                           5                        5                           5                            5                          5    Sensation - Light touch/temperature OR pain/vibration intact throughout    DTR's -             Biceps      Triceps     Brachioradialis      Patellar    Ankle    Toes/plantar response  R             2+             2+                  2+                       2+            2+                 Down  L              2+             2+                 2+                        2+           2+                 Down    Coordination - Finger to Nose intact b/l. No tremors appreciated    Gait and station - Normal casual gait. Romberg (-)    Labs:          CAPILLARY BLOOD GLUCOSE      POCT Blood Glucose.: 95 mg/dL (04 Jul 2023 16:33)            Radiology:     Neurology - Consult Note    -  Spectra: [phone] ([facility]), [phone] ([facility])  -    HPI: Patient JESSI FLORES is a 63y (1960) woman with PMH Migraine Headache presenting as code stroke for right eye blurry vision. Patient reports around 4 pm she started to developed blurry vision. Denies headache, jaw claudication, temporal tenderness, weakness, numbness, changes to     NIHSS 1  premRS 0     Review of Systems:   All other review of systems is negative unless indicated above.    Allergies:  No Known Allergies      PMHx/PSHx/Family Hx: As above, otherwise see below   Vertigo        Social Hx:  No current use of tobacco, alcohol, or illicit drugs    Medications:  MEDICATIONS  (STANDING):    MEDICATIONS  (PRN):      Vitals:  T(C): 36.9 (07-04-23 @ 16:30), Max: 36.9 (07-04-23 @ 16:30)  HR: 84 (07-04-23 @ 16:30) (84 - 84)  BP: 142/89 (07-04-23 @ 16:30) (142/89 - 142/89)  RR: 16 (07-04-23 @ 16:30) (16 - 16)  SpO2: 95% (07-04-23 @ 16:30) (95% - 95%)    Physical Examination: INCOMPLETE  General - NAD  Cardiovascular - Peripheral pulses palpable, no edema  Eyes - Fundoscopy with flat, sharp optic discs and no hemorrhage or exudates; Fundoscopy not well visualized; Fundoscopy not performed due to safety precautions in the setting of the COVID-19 pandemic    Neurologic Exam:  Mental status - Awake, Alert, Oriented to person, place, and time. Speech fluent, repetition and naming intact. Follows simple and complex commands. Attention/concentration, recent and remote memory (including registration and recall), and fund of knowledge intact    Cranial nerves - PERRLA, VFF, EOMI, face sensation (V1-V3) intact b/l, facial strength intact without asymmetry b/l, hearing intact b/l, palate with symmetric elevation, trapezius OR sternocleidomastiod 5/5 strength b/l, tongue midline on protrusion with full lateral movement    Motor - Normal bulk and tone throughout. No pronator drift.  Strength testing            Deltoid      Biceps      Triceps     Wrist Extension    Wrist Flexion     Interossei         R            5                 5               5                     5                              5                        5                 5  L             5                 5               5                     5                              5                        5                 5              Hip Flexion    Hip Extension    Knee Flexion    Knee Extension    Dorsiflexion    Plantar Flexion  R              5                           5                       5                           5                            5                          5  L              5                           5                        5                           5                            5                          5    Sensation - Light touch/temperature OR pain/vibration intact throughout    DTR's -             Biceps      Triceps     Brachioradialis      Patellar    Ankle    Toes/plantar response  R             2+             2+                  2+                       2+            2+                 Down  L              2+             2+                 2+                        2+           2+                 Down    Coordination - Finger to Nose intact b/l. No tremors appreciated    Gait and station - Normal casual gait. Romberg (-)    Labs:          CAPILLARY BLOOD GLUCOSE      POCT Blood Glucose.: 95 mg/dL (04 Jul 2023 16:33)            Radiology:     Neurology - Consult Note    -  Spectra: [phone] ([facility]), [phone] ([facility])  -    HPI: Patient JESSI FLORES is a 63y (1960) woman with PMH Migraine Headache presenting as code stroke for right eye blurry vision. Patient reports around 4 pm she started to developed blurry vision. SHe reports mild improvement. She is able to see, but everything is blurry. Denies headache, jaw claudication, temporal tenderness, weakness, numbness, changes to speech, vision loss, flashes or floaters.     NIHSS 1 (can see fingers, but unable to see fingers out of right eye only in all quadrants)  premRS 0     Review of Systems:   All other review of systems is negative unless indicated above.    Allergies:  No Known Allergies      PMHx/PSHx/Family Hx: As above, otherwise see below   Vertigo        Social Hx:  No current use of tobacco, alcohol, or illicit drugs    Medications:  MEDICATIONS  (STANDING):    MEDICATIONS  (PRN):      Vitals:  T(C): 36.9 (07-04-23 @ 16:30), Max: 36.9 (07-04-23 @ 16:30)  HR: 84 (07-04-23 @ 16:30) (84 - 84)  BP: 142/89 (07-04-23 @ 16:30) (142/89 - 142/89)  RR: 16 (07-04-23 @ 16:30) (16 - 16)  SpO2: 95% (07-04-23 @ 16:30) (95% - 95%)    Physical Examination:   General - NAD      Neurologic Exam:  Mental status - Awake, Alert, Oriented to person, place, and time. Speech fluent, repetition and naming intact. Follows simple and complex commands.     Cranial nerves - PERRL, VFF, EOMI, face sensation (V1-V3) intact b/l, facial strength intact without asymmetry b/l, hearing intact b/l, palate with symmetric elevation,  sternocleidomastiod 5/5 strength b/l, tongue midline on protrusion with full lateral movement  no RAPD  no temple tenderness, normal pulses     Motor - Normal bulk and tone throughout. No pronator drift.  Strength testing            Deltoid      Biceps      Triceps     Wrist Extension    Wrist Flexion            R            5                 5               5                     5                              5                        5                  L             5                 5               5                     5                              5                        5                              Hip Flexion    Hip Extension    Knee Flexion    Knee Extension    Dorsiflexion    Plantar Flexion  R              5                           5                       5                           5                            5                          5  L              5                           5                        5                           5                            5                          5    Sensation - Light touch/temperature  intact throughout      Coordination - Finger to Nose intact b/l. No tremors appreciated    Gait and station - Normal casual gait.     Labs:          CAPILLARY BLOOD GLUCOSE      POCT Blood Glucose.: 95 mg/dL (04 Jul 2023 16:33)            Radiology:  < from: CT Brain Perfusion Maps Stroke (07.04.23 @ 17:10) >  CT brain perfusion:  No evidence of acute infarct or ischemia.    CTA neck:  No stenosis. No dissection.  Heterogeneously enhancing enlarged right palatine tonsil with ipsilateral   level II adenopathy. In the absence of clinical evidence for an   infectious etiology, recommend visual inspection to exclude neoplasm.    CTA brain:  No stenosis or aneurysm.    Findings were discussed by Dr. ELVIRA Clayton with Dr. VIDYA HINES on 7/4/2023   at 5:34 PM.    COMMENT:  Reference per NASCET criteria for degree of stenosis: Mild: less than 50%   stenosis. Moderate: 50-69% stenosis. Severe: 70-94% stenosis. Near   occlusion: 95-99% stenosis.    Per RAPID assessment for acute infarct,threshold for ischemic tissue   volume is Tmax > 6 sec. Threshold for infarct core volume estimate is CBF   < 30 percent. Threshold for poor collateral flow or severe delayed   perfusion is Tmax > 10 sec.    < from: CT Brain Stroke Protocol (07.04.23 @ 17:09) >  IMPRESSION:  There appears to be hypodensity involving the cortex at the parasagittal   left occipital lobe raising concern for possible acute infarct.    < end of copied text >       Neurology - Consult Note    -  Spectra: [phone] ([facility]), [phone] ([facility])  -    HPI: Patient JESSI FLORES is a 63y (1960) woman with PMH Migraine Headache presenting as code stroke for right eye blurry vision. Patient reports around 4 pm she started to developed blurry vision. SHe reports mild improvement. She is able to see, but everything is blurry. Denies headache, jaw claudication, temporal tenderness, weakness, numbness, changes to speech, vision loss, flashes or floaters.     NIHSS 1 (can see fingers, but unable to see fingers out of right eye only in all quadrants)  premRS 0     Review of Systems:   All other review of systems is negative unless indicated above.    Allergies:  No Known Allergies      PMHx/PSHx/Family Hx: As above, otherwise see below   Vertigo        Social Hx:  No current use of tobacco, alcohol, or illicit drugs    Medications:  MEDICATIONS  (STANDING):    MEDICATIONS  (PRN):      Vitals:  T(C): 36.9 (07-04-23 @ 16:30), Max: 36.9 (07-04-23 @ 16:30)  HR: 84 (07-04-23 @ 16:30) (84 - 84)  BP: 142/89 (07-04-23 @ 16:30) (142/89 - 142/89)  RR: 16 (07-04-23 @ 16:30) (16 - 16)  SpO2: 95% (07-04-23 @ 16:30) (95% - 95%)    Physical Examination:   General - NAD      Neurologic Exam:  Mental status - Awake, Alert, Oriented to person, place, and time. Speech fluent, repetition and naming intact. Follows simple and complex commands.     Cranial nerves - PERRL, VFF left eye, able to see fingers moving, but cannot distinguish between number of fingers in all quadrants, EOMI, face sensation (V1-V3) intact b/l, facial strength intact without asymmetry b/l, hearing intact b/l, palate with symmetric elevation,  sternocleidomastiod 5/5 strength b/l, tongue midline on protrusion with full lateral movement  no RAPD  no temple tenderness, normal pulses     Motor - Normal bulk and tone throughout. No pronator drift.  Strength testing            Deltoid      Biceps      Triceps     Wrist Extension    Wrist Flexion            R            5                 5               5                     5                              5                        5                  L             5                 5               5                     5                              5                        5                              Hip Flexion    Hip Extension    Knee Flexion    Knee Extension    Dorsiflexion    Plantar Flexion  R              5                           5                       5                           5                            5                          5  L              5                           5                        5                           5                            5                          5    Sensation - Light touch/temperature  intact throughout      Coordination - Finger to Nose intact b/l. No tremors appreciated    Gait and station - Normal casual gait.     Labs:          CAPILLARY BLOOD GLUCOSE      POCT Blood Glucose.: 95 mg/dL (04 Jul 2023 16:33)            Radiology:  < from: CT Brain Perfusion Maps Stroke (07.04.23 @ 17:10) >  CT brain perfusion:  No evidence of acute infarct or ischemia.    CTA neck:  No stenosis. No dissection.  Heterogeneously enhancing enlarged right palatine tonsil with ipsilateral   level II adenopathy. In the absence of clinical evidence for an   infectious etiology, recommend visual inspection to exclude neoplasm.    CTA brain:  No stenosis or aneurysm.    Findings were discussed by Dr. ELVIRA Clayton with Dr. VIDYA HINES on 7/4/2023   at 5:34 PM.    COMMENT:  Reference per NASCET criteria for degree of stenosis: Mild: less than 50%   stenosis. Moderate: 50-69% stenosis. Severe: 70-94% stenosis. Near   occlusion: 95-99% stenosis.    Per RAPID assessment for acute infarct,threshold for ischemic tissue   volume is Tmax > 6 sec. Threshold for infarct core volume estimate is CBF   < 30 percent. Threshold for poor collateral flow or severe delayed   perfusion is Tmax > 10 sec.    < from: CT Brain Stroke Protocol (07.04.23 @ 17:09) >  IMPRESSION:  There appears to be hypodensity involving the cortex at the parasagittal   left occipital lobe raising concern for possible acute infarct.    < end of copied text >

## 2023-07-04 NOTE — ED ADULT NURSE NOTE - NSFALLRISKINTERV_ED_ALL_ED

## 2023-07-04 NOTE — ED PROVIDER NOTE - PHYSICAL EXAMINATION
GEN: Awake, AOx3, NAD.  HEENT: NCAT  ---EYES: no scleral icterus, EOMI, PERRLA (6mm --> 3mm)  CARDIO: RRR. Normal S1/S2, no m/r/g. No JVD.  RESP: CTAB, no w/r/r  ABD: Soft, NTND.  MSK: No obvious deformity or ROM deficit. 2+ pulses x4. No edema.  SKIN: Warm, dry. No rashes. Nail beds without cyanosis or clubbing.  NEURO:  - CN II-IV, VI: VA grossly intact (OU sc J1, OD CF 3ft, OS J1)  - CN V: Sensation Intact in b/l V1-V3 distribution; masseter strength intact & symmetric b/l  - CN VII: Symmetric Brow Raise & Smile  - CN VIII: hearing grossly intact  - CN IX-XII: Shoulder shrug symmetric b/l (5/5); Tongue & Uvula Midline  - MOTOR: Strength 5/5 at all major joints in upper & lower extremities  - SENSORY  --- Sensation grossly intact x4 extremities  PSYCH: Appropriate mood & affect.

## 2023-07-04 NOTE — CHART NOTE - NSCHARTNOTEFT_GEN_A_CORE
Ophthalmology called regarding possibility of patient having CRAO. Spoke with Dr. Dunn regarding candidacy for intra-arterial tPA in this patient. Dr. Dunn spoke with ophthalmology resident and with neuro-IR team at [facility].   At this time, patient complaining of blurry vision in her R eye, is able to finger count, with some difficulty in the temporal aspect of R eye vision, and having difficulty with reading with her R eye.   Per neuroIR, after discussion with ophtho, patient does not have a definitive CRAO, no classic cherry red spot. Intra-arterial tPA is still an experimental treatment and given patient's relatively milder symptom and non-definitive diagnosis, patient is not a candidate for intra-arterial thrombolytics at this time. NeuroIR input was appreciated.    Continue rest of plan as per prior neurology consult note.  Ophthalmology recommendations appreciated. Ophthalmology called regarding possibility of patient having CRAO. Spoke with telestroke attending Dr. Agatha Dunn regarding candidacy for intra-arterial tPA in this patient. Dr. Dunn spoke with ophthalmology resident and with neuro-IR team at [facility].   At this time, patient complaining of blurry vision in her R eye, is able to finger count, with some difficulty in the temporal aspect of R eye vision, and having difficulty with reading with her R eye.   Per neuroIR, after discussion with ophtho, patient does not have a definitive CRAO, no classic cherry red spot. Intra-arterial tPA is still an experimental treatment and given patient's relatively milder symptom and non-definitive diagnosis, patient is not a candidate for intra-arterial thrombolytics at this time. NeuroIR input was appreciated.    Continue rest of plan as per prior neurology consult note.  Ophthalmology recommendations appreciated.

## 2023-07-04 NOTE — ED PROVIDER NOTE - ATTENDING CONTRIBUTION TO CARE
I have personally performed a face to face medical and diagnostic evaluation of the patient. I have discussed with and reviewed the Resident's note and agree with the History, ROS, Physical Exam and MDM unless otherwise indicated. A brief summary of my personal evaluation and impression can be found below.    Melinda PACE: 63F no reported pmh presents with a cc of sudden onset painless L vision loss/blurry vision about 45min prior to arrival, has hx of migrane but no HA, nausea, vomiting, no fever, rash. Denies numbness, tingling or loss of sensation. Denies loss of motor function. No CP SOB or MATT. Denies n/v/f/c/cp/sob. Denies headache, syncope, lightheadedness, dizziness. Denies chest palpitations, abdominal pain. Denies edema. Denies dysuria,, diarrhea,     All other ROS negative, except as above and as per HPI and ROS section.    VITALS: Initial triage and subsequent vitals have been reviewed by me.  GEN APPEARANCE: Alert, non-toxic, well-appearing, NAD.  HEAD: Atraumatic.  EYES: PERRLa, EOMI, vision grossly intact, eye exam as above   NECK: Supple  CV: RRR, S1S2, no c/r/m/g. Cap refill <2 seconds. No bruits.   LUNGS: CTAB. No abnormal breath sounds.  ABDOMEN: Soft, NTND. No guarding or rebound.   MSK/EXT: No spinal or extremity point tenderness. No CVA ttp. Pelvis stable. No peripheral edema.  NEURO: Alert, follows commands. Weight bearing normal. Speech normal. Sensation and motor normal x4 extremities. CN2-12 normal, coordination normal, ambulating normally. UE & LE 5/5 b/l.  SKIN: Warm, dry and intact. No rash.  PSYCH: Appropriate    Plan/MDM: exam vss non toxic PE as above ddx c/f acute cva consider crao or occipital, code stroke called in triage, will get code stroke bundle, neuro consult, consider optho consult however have less c/f primary eye pathology at this time, presentation not c/w acute glaucoma, no trauma reported.

## 2023-07-04 NOTE — CONSULT NOTE ADULT - SUBJECTIVE AND OBJECTIVE BOX
[facility] DEPARTMENT OF OPHTHALMOLOGY - INITIAL ADULT CONSULT  -----------------------------------------------------------------------------------------------------------------  Deepika Calvin MD PGY 2  Contact via [employer] Teams  -----------------------------------------------------------------------------------------------------------------    HPI:    Interval History: ***    PAST MEDICAL & SURGICAL HISTORY:  Vertigo      Migraines      No significant past surgical history        Past Ocular History: ***    Family History:  FAMILY HISTORY:  No pertinent family history in first degree relatives        Social History: ***    Ophthalmic Medications: ***    MEDICATIONS  (STANDING):  aspirin  chewable 324 milliGRAM(s) Oral once  atorvastatin 80 milliGRAM(s) Oral at bedtime  clopidogrel Tablet 300 milliGRAM(s) Oral Once  sodium chloride 0.9%. 1000 milliLiter(s) (150 mL/Hr) IV Continuous <Continuous>    MEDICATIONS  (PRN):    Allergies & Intolerances:   No Known Allergies    Review of Systems:  Constitutional: No fever, chills  Eyes: No blurry vision, flashes, floaters, FBS, erythema, discharge, double vision, OU  Neuro: No tremors  Cardiovascular: No chest pain, palpitations  Respiratory: No SOB, no cough  GI: No nausea, vomiting, abdominal pain  : No dysuria  Skin: no rash  Psych: no depression  Endocrine: no polyuria, polydipsia  Heme/lymph: no swelling    VITALS: T(C): 36.9 (07-04-23 @ 16:30)  T(F): 98.4 (07-04-23 @ 16:30), Max: 98.4 (07-04-23 @ 16:30)  HR: 72 (07-04-23 @ 17:30) (72 - 84)  BP: 149/86 (07-04-23 @ 17:30) (142/89 - 149/86)  RR:  (16 - 19)  SpO2:  (95% - 100%)  Wt(kg): --  General: AAO x 3, appropriate mood and affect    Ophthalmology Exam:  Visual acuity (sc): CF 5 ft PH 20/400 OD, 20/20 OS OU.  Pupils: PERRL OU, no APD  Tonometry:  21 OD, 25 OS  Extraocular movements (EOMs): Full OU, no pain, no diplopia.  Confrontational Visual Field (CVF): OD: Temporal field constriction. Full nasally. OS: Full  Color Plates: OD: 9/12 OS: 12/12    Pen Light Exam (PLE)  External: Normal OU.  Lids/Lashes/Lacrimal Ducts: Flat OU.  Sclera/Conjunctiva: White and quiet OU.  Cornea: Clear OU.  Anterior Chamber: Deep and formed OU.    Iris: Flat OU.  Lens: Clear OU.    Fundus Exam: dilated with 1% tropicamide and 2.5% phenylephrine  Approval obtained from primary team for dilation  Patient aware that pupils can remained dilated for at least 4-6 hours.  Exam performed with 20 D lens    Vitreous: wnl OU  Disc, cup/disc: sharp and pink, 0.4 OU  Macula: wnl OU  Vessels: wnl OU  Periphery: wnl OU    Labs/Imaging:  < from: CT Brain Perfusion Maps Stroke (07.04.23 @ 17:10) >  IMPRESSION:  CT brain perfusion:  No evidence of acute infarct or ischemia.    CTA neck:  No stenosis. No dissection.  Heterogeneously enhancing enlarged right palatine tonsil with ipsilateral   level II adenopathy. In the absence of clinical evidence for an   infectious etiology, recommend visual inspection to exclude neoplasm.    CTA brain:  No stenosis or aneurysm.    < end of copied text >     [facility] DEPARTMENT OF OPHTHALMOLOGY - INITIAL ADULT CONSULT  -----------------------------------------------------------------------------------------------------------------  Deepika Calvin MD PGY 2  Contact via [employer] Teams  -----------------------------------------------------------------------------------------------------------------    HPI: Per ED  Patient JESSI FLORES is a 63y (1960) woman with PMH Migraine Headache presenting as code stroke for right eye blurry vision. Patient reports around 4 pm she started to developed blurry vision. SHe reports mild improvement. She is able to see, but everything is blurry. Denies headache, jaw claudication, temporal tenderness, weakness, numbness, changes to speech, vision loss, flashes or floaters.     Interval History: Sudden onset blurry vision x3-4 hours. Denies blackout of vision. Denies flashes/floaters/curtains. Denies eye pain. Denies jaw claudication, temporal headache, scalp tenderness, PMR, fatigue, anorexia, weight loss.     PAST MEDICAL & SURGICAL HISTORY:  Vertigo      Migraines      No significant past surgical history        Past Ocular History: No known history    Family History:  FAMILY HISTORY:  No pertinent family history in first degree relatives      Ophthalmic Medications: None    MEDICATIONS  (STANDING):  aspirin  chewable 324 milliGRAM(s) Oral once  atorvastatin 80 milliGRAM(s) Oral at bedtime  clopidogrel Tablet 300 milliGRAM(s) Oral Once  sodium chloride 0.9%. 1000 milliLiter(s) (150 mL/Hr) IV Continuous <Continuous>    MEDICATIONS  (PRN):    Allergies & Intolerances:   No Known Allergies    Review of Systems:  Constitutional: No fever, chills  Eyes: + blurry vision, Denies flashes, floaters, FBS, erythema, discharge, double vision, OU  Neuro: No tremors  Cardiovascular: No chest pain, palpitations  Respiratory: No SOB, no cough  GI: No nausea, vomiting, abdominal pain  : No dysuria  Skin: no rash  Psych: no depression  Endocrine: no polyuria, polydipsia  Heme/lymph: no swelling    VITALS: T(C): 36.9 (07-04-23 @ 16:30)  T(F): 98.4 (07-04-23 @ 16:30), Max: 98.4 (07-04-23 @ 16:30)  HR: 72 (07-04-23 @ 17:30) (72 - 84)  BP: 149/86 (07-04-23 @ 17:30) (142/89 - 149/86)  RR:  (16 - 19)  SpO2:  (95% - 100%)  Wt(kg): --  General: AAO x 3, appropriate mood and affect    Ophthalmology Exam:  Visual acuity (sc): CF 5 ft PH 20/400 OD, 20/20 OS OU.  Pupils: PERRL OU, no APD  Tonometry:  21 OD, 25 OS  Extraocular movements (EOMs): Full OU, no pain, no diplopia.  Confrontational Visual Field (CVF): OD: Temporal field constriction. Full nasally. OS: Full  Color Plates: OD: 9/12 OS: 12/12    Pen Light Exam (PLE)  External: Normal OU.  Lids/Lashes/Lacrimal Ducts: Flat OU.  Sclera/Conjunctiva: White and quiet OU.  Cornea: Clear OU.  Anterior Chamber: Deep and formed OU.    Iris: Bowed iris OU.  Lens: Clear OU.    Fundus Exam: dilated with 1% tropicamide and 2.5% phenylephrine  Approval obtained from primary team for dilation  Patient aware that pupils can remained dilated for at least 4-6 hours.  Exam performed with 20 D lens    Vitreous: wnl OU  Disc, cup/disc: sharp and pink, 0.45 OD, 0.3 OS  Macula: wnl OU  Vessels: wnl OU  Periphery: wnl OU    Labs/Imaging:  < from: CT Brain Perfusion Maps Stroke (07.04.23 @ 17:10) >  IMPRESSION:  CT brain perfusion:  No evidence of acute infarct or ischemia.    CTA neck:  No stenosis. No dissection.  Heterogeneously enhancing enlarged right palatine tonsil with ipsilateral   level II adenopathy. In the absence of clinical evidence for an   infectious etiology, recommend visual inspection to exclude neoplasm.    CTA brain:  No stenosis or aneurysm.    < end of copied text >     [facility] DEPARTMENT OF OPHTHALMOLOGY - INITIAL ADULT CONSULT  -----------------------------------------------------------------------------------------------------------------  Deepika Calvin MD PGY 2  Contact via [employer] Teams  -----------------------------------------------------------------------------------------------------------------    HPI: Per ED  Patient JESSI FLORES is a 63y (1960) woman with PMH Migraine Headache presenting as code stroke for right eye blurry vision. Patient reports around 4 pm she started to developed blurry vision. SHe reports mild improvement. She is able to see, but everything is blurry. Denies headache, jaw claudication, temporal tenderness, weakness, numbness, changes to speech, vision loss, flashes or floaters.     Interval History: Sudden onset blurry vision x3-4 hours. Denies blackout of vision. Denies flashes/floaters/curtains. Denies eye pain. Denies jaw claudication, temporal headache, scalp tenderness, PMR, fatigue, anorexia, weight loss.     PAST MEDICAL & SURGICAL HISTORY:  Vertigo      Migraines      No significant past surgical history        Past Ocular History: No known history    Family History:  FAMILY HISTORY:  No pertinent family history in first degree relatives      Ophthalmic Medications: None    MEDICATIONS  (STANDING):  aspirin  chewable 324 milliGRAM(s) Oral once  atorvastatin 80 milliGRAM(s) Oral at bedtime  clopidogrel Tablet 300 milliGRAM(s) Oral Once  sodium chloride 0.9%. 1000 milliLiter(s) (150 mL/Hr) IV Continuous <Continuous>    MEDICATIONS  (PRN):    Allergies & Intolerances:   No Known Allergies    Review of Systems:  Constitutional: No fever, chills  Eyes: + blurry vision, Denies flashes, floaters, FBS, erythema, discharge, double vision, OU  Neuro: No tremors  Cardiovascular: No chest pain, palpitations  Respiratory: No SOB, no cough  GI: No nausea, vomiting, abdominal pain  : No dysuria  Skin: no rash  Psych: no depression  Endocrine: no polyuria, polydipsia  Heme/lymph: no swelling    VITALS: T(C): 36.9 (07-04-23 @ 16:30)  T(F): 98.4 (07-04-23 @ 16:30), Max: 98.4 (07-04-23 @ 16:30)  HR: 72 (07-04-23 @ 17:30) (72 - 84)  BP: 149/86 (07-04-23 @ 17:30) (142/89 - 149/86)  RR:  (16 - 19)  SpO2:  (95% - 100%)  Wt(kg): --  General: AAO x 3, appropriate mood and affect    Ophthalmology Exam:  Visual acuity (sc): CF 5 ft PH 20/400 OD, 20/20 OS OU.  Pupils: PERRL OU, no APD  Tonometry:  21 OD, 25 OS  Extraocular movements (EOMs): Full OU, no pain, no diplopia.  Confrontational Visual Field (CVF): OD: Temporal field constriction. Full nasally. OS: Full  Color Plates: OD: 9/12 OS: 12/12    Pen Light Exam (PLE)  External: Normal OU.  Lids/Lashes/Lacrimal Ducts: Flat OU.  Sclera/Conjunctiva: White and quiet OU.  Cornea: Clear OU.  Anterior Chamber: Deep and formed OU.    Iris: Bowed iris OU.  Lens: Clear OU.    Fundus Exam: dilated with 1% tropicamide and 2.5% phenylephrine  Approval obtained from primary team for dilation  Patient aware that pupils can remained dilated for at least 4-6 hours.  Exam performed with 20 D lens    Vitreous: wnl OU  Disc, cup/disc: sharp and pink, 0.45 OD, 0.3 OS  Macula. OD: retinal pallor. Possible early cherry red spot. OS: wnl   Vessels: wnl OU  Periphery: wnl OU    Labs/Imaging:  < from: CT Brain Perfusion Maps Stroke (07.04.23 @ 17:10) >  IMPRESSION:  CT brain perfusion:  No evidence of acute infarct or ischemia.    CTA neck:  No stenosis. No dissection.  Heterogeneously enhancing enlarged right palatine tonsil with ipsilateral   level II adenopathy. In the absence of clinical evidence for an   infectious etiology, recommend visual inspection to exclude neoplasm.    CTA brain:  No stenosis or aneurysm.    < end of copied text >     [facility] DEPARTMENT OF OPHTHALMOLOGY - INITIAL ADULT CONSULT  -----------------------------------------------------------------------------------------------------------------  Deepika Calvin MD PGY 2  Contact via [employer] Teams  -----------------------------------------------------------------------------------------------------------------    HPI: Per ED  Patient JESSI FLORES is a 63y (1960) woman with PMH Migraine Headache presenting as code stroke for right eye blurry vision. Patient reports around 4 pm she started to developed blurry vision. SHe reports mild improvement. She is able to see, but everything is blurry. Denies headache, jaw claudication, temporal tenderness, weakness, numbness, changes to speech, vision loss, flashes or floaters.     Interval History: Sudden onset blurry vision x3-4 hours. Denies blackout of vision. Denies flashes/floaters/curtains. Denies eye pain. Denies jaw claudication, temporal headache, scalp tenderness, PMR, fatigue, anorexia, weight loss.     PAST MEDICAL & SURGICAL HISTORY:  Vertigo      Migraines      No significant past surgical history        Past Ocular History: No known history    Family History:  FAMILY HISTORY:  No pertinent family history in first degree relatives      Ophthalmic Medications: None    MEDICATIONS  (STANDING):  aspirin  chewable 324 milliGRAM(s) Oral once  atorvastatin 80 milliGRAM(s) Oral at bedtime  clopidogrel Tablet 300 milliGRAM(s) Oral Once  sodium chloride 0.9%. 1000 milliLiter(s) (150 mL/Hr) IV Continuous <Continuous>    MEDICATIONS  (PRN):    Allergies & Intolerances:   No Known Allergies    Review of Systems:  Constitutional: No fever, chills  Eyes: + blurry vision, Denies flashes, floaters, FBS, erythema, discharge, double vision, OU  Neuro: No tremors  Cardiovascular: No chest pain, palpitations  Respiratory: No SOB, no cough  GI: No nausea, vomiting, abdominal pain  : No dysuria  Skin: no rash  Psych: no depression  Endocrine: no polyuria, polydipsia  Heme/lymph: no swelling    VITALS: T(C): 36.9 (07-04-23 @ 16:30)  T(F): 98.4 (07-04-23 @ 16:30), Max: 98.4 (07-04-23 @ 16:30)  HR: 72 (07-04-23 @ 17:30) (72 - 84)  BP: 149/86 (07-04-23 @ 17:30) (142/89 - 149/86)  RR:  (16 - 19)  SpO2:  (95% - 100%)  Wt(kg): --  General: AAO x 3, appropriate mood and affect    Ophthalmology Exam:  Visual acuity (sc): CF 5 ft PH 20/400 OD, 20/20 OS OU.  Pupils: PERRL OU, no APD  Tonometry:  21 OD, 25 OS  Extraocular movements (EOMs): Full OU, no pain, no diplopia.  Confrontational Visual Field (CVF): OD: Temporal field constriction. Full nasally. OS: Full  Color Plates: OD: 9/12 OS: 12/12    Pen Light Exam (PLE)  External: Normal OU.  Lids/Lashes/Lacrimal Ducts: Flat OU.  Sclera/Conjunctiva: White and quiet OU.  Cornea: Clear OU.  Anterior Chamber: Deep and formed OU.    Iris: Bowed iris OU.  Lens: Clear OU.    Fundus Exam: dilated with 1% tropicamide and 2.5% phenylephrine  Approval obtained from primary team for dilation  Patient aware that pupils can remained dilated for at least 4-6 hours.  Exam performed with 20 D lens    Vitreous: wnl OU  Disc, cup/disc: sharp and pink, 0.55 OD, 0.3 OS  Macula. OD: retinal pallor. OS: wnl   Vessels: wnl OU  Periphery: wnl OU    Labs/Imaging:  < from: CT Brain Perfusion Maps Stroke (07.04.23 @ 17:10) >  IMPRESSION:  CT brain perfusion:  No evidence of acute infarct or ischemia.    CTA neck:  No stenosis. No dissection.  Heterogeneously enhancing enlarged right palatine tonsil with ipsilateral   level II adenopathy. In the absence of clinical evidence for an   infectious etiology, recommend visual inspection to exclude neoplasm.    CTA brain:  No stenosis or aneurysm.    < end of copied text >

## 2023-07-04 NOTE — ED PROVIDER NOTE - NS ED ROS FT
GEN: No fever, chills, night sweats, weight loss  EYES: as per HPI  ENT: No ear pain, congestion, sore throat  RESP: No cough or trouble breathing  CARDIOVASCULAR: No chest pain or palpitations  GI: No nausea/vomiting, diarrhea, constipation  :  No change in urine output; no dysuria, hematuria, or discharge  MSK: No joint or muscle pain  SKIN: No rashes  NEURO: No headache; no abnormal movements; no numbness or tingling  Remainder negative, except as per the HPI

## 2023-07-04 NOTE — ED ADULT NURSE REASSESSMENT NOTE - NS ED NURSE REASSESS COMMENT FT1
PTAOX4,c/o blurry vision since 4pm.denies any past medical history. . states she was getting her outfit ready for holiday gathering and started to see blurry in right eye NAD. connected to CM shows NSR. son by bed side translating. awaiting results.

## 2023-07-04 NOTE — CONSULT NOTE ADULT - ASSESSMENT
63y (1960) woman with PMH Migraine Headache presenting as code stroke for right eye blurry vision. Patient reports around 4 pm she started to developed blurry vision. SHe reports mild improvement. She is able to see, but everything is blurry. Denies headache, jaw claudication, temporal tenderness, weakness, numbness, changes to speech, vision loss, flashes or floaters.  CTH hypodensity in left occipital lobe.     Not Tenecteplase candidate due to completed infarct seen on CTH-hypodensity left occipital lobe   Not thrombectomy candidate due to no LVO    Impression: Right eye blurry vision likely due to left occipital lobe acute infarct Mechanism unknown     Recommendation:      Imaging/Labs  [] MRI brain w/o contrast to look at the extent and distribution of the stroke  [] TTE   [] HbA1C and Lipid Panel    Meds  [] Load  mg once  [] Start Aspirin 81MG PO daily   [] Load Plavix 300 mg once  [] Start Plavix 75 mg daily for 3 weeks   [] Atorvastatin 80MG QHS, titrate to LDL<70  [] DVT prophylaxis - Lovenox 40MG SC daily    Other  [] Telemonitoring; Neurochecks and vital signs per unit protocol, Q4H  [] Permissive HTN up to 220/120 mmHg for first 24 hours after symptom onset followed by gradual normotension.   [] BG goal <180, avoid hypoglycemia  [] NPO until clears dysphagia screen, otherwise swallow evaluation  [] Fall, aspiration precautions  [] PT/OT eval    Discussed with Telestroke attending Dr. Dunn.  63y (1960) woman with PMH Migraine Headache presenting as code stroke for right eye blurry vision. Patient reports around 4 pm she started to developed blurry vision. SHe reports mild improvement. She is able to see, but everything is blurry. Denies headache, jaw claudication, temporal tenderness, weakness, numbness, changes to speech, vision loss, flashes or floaters.  CTH hypodensity in left occipital lobe.     Not Tenecteplase candidate due to completed infarct seen on CTH-hypodensity left occipital lobe   Not thrombectomy candidate due to no LVO    Impression: Right eye blurry vision likely due to left occipital lobe acute infarct Mechanism unknown     Recommendation:      Imaging/Labs  [] MRI brain and orbits w/ w/o contrast to look at the extent and distribution of the stroke  [] TTE   [] HbA1C and Lipid Panel  [] ESR/CRP    Meds  [] Load  mg once  [] Start Aspirin 81MG PO daily   [] Load Plavix 300 mg once  [] Start Plavix 75 mg daily for 3 weeks   [] Atorvastatin 80MG QHS, titrate to LDL<70  [] DVT prophylaxis - Lovenox 40MG SC daily    Other  [] Telemonitoring; Neurochecks and vital signs per unit protocol, Q4H  [] Permissive HTN up to 220/120 mmHg for first 24 hours after symptom onset followed by gradual normotension.   [] BG goal <180, avoid hypoglycemia  [] NPO until clears dysphagia screen, otherwise swallow evaluation  [] Fall, aspiration precautions  [] PT/OT eval    Discussed with Telestroke attending Dr. Dunn.

## 2023-07-04 NOTE — ED ADULT NURSE NOTE - OBJECTIVE STATEMENT
Patient arrives to the ED as a code stroke for right eye blurriness/ vision loss starting at 4pm. No other neuro deficits noted. Patient breathing even and nonlabored. No acute distress. Patient ambulatory. Denies any headache or dizziness. 20g IV placed to left AC. Labs sent as ordered. Neurocheck as documented. Report given to primary RN. Safety maintained. Patient stable upon exiting the room.

## 2023-07-04 NOTE — ED ADULT NURSE REASSESSMENT NOTE - NS ED NURSE REASSESS COMMENT FT1
patient feels better from earlier. meds given as ordered. patient is admitted to CDU . report given to CDU. awaiting transport.

## 2023-07-04 NOTE — ED PROVIDER NOTE - CLINICAL SUMMARY MEDICAL DECISION MAKING FREE TEXT BOX
63F with h/o migraines p/w sudden onset right eye blurry vision w/o other focal neuro deficits. Code stroke called given acuity of onset.  - CTA - focus on ophthalmic branches if possible  - Ophtho C/S  - Neuro C/S  - CVA eval, interventions pending imaging  - likely CDU for MRI

## 2023-07-04 NOTE — CONSULT NOTE ADULT - ATTENDING COMMENTS
I saw and examined the patient during AM stroke rounds.   Patient endorses new visual deficits involving the R eye yesterday after at about 1500.    No other symptoms, including headache, muscle aches.      On exam:   GEN: sitting up in chair, NAD  CV: RRR, S1, S2  PULM: CTABG    NEURO:   Awake, alert, oriented, speaking fluently via son who is interpreting.   Pupils 3mm, sluggishly reactive, symmetric, no rAPD, does have a temporal field cut in the R eye, no red desaturation, full EOM, conjugate gaze, no facial weakness, no dysarthria, tongue midline, palate symmetric.    MOTOR: normal bulk and tone.  No drift.  5/5 throughout to confrontation.   SENSROY: intact symmetrically to light touch.   COORDINATION: normal FNF  GAIT: narrow based.  Walks in place with normal stride.  Stands on one leg, R and L.     CTH images reviewed: no hemorrhage.  No large territorial infarct.   CTA H&N images reviewed: no significant cervical carotid stenosis.  No large vessel occlusion.     AP: 63 year old woman with migraine, presenting with diminished visual acuity in the R eye.  On exam, she does seem to have a temporal field cut in the R eye, and ophtho DFE reporting relative retinal pallor.  No acute findings on imaging.    Optic neuropathy, unclear etiology at this point, ischemic is possible, but evidence for it is somewhat lacking at this point.  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    -MRI brain pending, may be informative  -will send TSH, B1, B12  -neurology to follow.  Please page or call with any acute changes, or other issues we can help address.

## 2023-07-05 VITALS
DIASTOLIC BLOOD PRESSURE: 76 MMHG | HEART RATE: 68 BPM | TEMPERATURE: 98 F | SYSTOLIC BLOOD PRESSURE: 151 MMHG | OXYGEN SATURATION: 97 % | RESPIRATION RATE: 17 BRPM

## 2023-07-05 LAB
24R-OH-CALCIDIOL SERPL-MCNC: 22 NG/ML — LOW (ref 30–80)
CHOLEST SERPL-MCNC: 169 MG/DL — SIGNIFICANT CHANGE UP
CRP SERPL-MCNC: <3 MG/L — SIGNIFICANT CHANGE UP
ERYTHROCYTE [SEDIMENTATION RATE] IN BLOOD: 18 MM/HR — SIGNIFICANT CHANGE UP (ref 4–25)
HDLC SERPL-MCNC: 40 MG/DL — LOW
LIPID PNL WITH DIRECT LDL SERPL: 112 MG/DL — HIGH
NON HDL CHOLESTEROL: 129 MG/DL — SIGNIFICANT CHANGE UP
TRIGL SERPL-MCNC: 83 MG/DL — SIGNIFICANT CHANGE UP
VIT D25+D1,25 OH+D1,25 PNL SERPL-MCNC: 71.2 PG/ML — SIGNIFICANT CHANGE UP (ref 19.9–79.3)

## 2023-07-05 PROCEDURE — 93306 TTE W/DOPPLER COMPLETE: CPT | Mod: 26

## 2023-07-05 PROCEDURE — 99285 EMERGENCY DEPT VISIT HI MDM: CPT

## 2023-07-05 PROCEDURE — 99238 HOSP IP/OBS DSCHRG MGMT 30/<: CPT

## 2023-07-05 PROCEDURE — 70543 MRI ORBT/FAC/NCK W/O &W/DYE: CPT | Mod: 26,MA

## 2023-07-05 PROCEDURE — 99284 EMERGENCY DEPT VISIT MOD MDM: CPT

## 2023-07-05 PROCEDURE — 70552 MRI BRAIN STEM W/DYE: CPT | Mod: 26,MA

## 2023-07-05 RX ORDER — ATORVASTATIN CALCIUM 80 MG/1
1 TABLET, FILM COATED ORAL
Refills: 0
Start: 2023-07-05

## 2023-07-05 RX ADMIN — Medication 81 MILLIGRAM(S): at 12:01

## 2023-07-05 RX ADMIN — CLOPIDOGREL BISULFATE 75 MILLIGRAM(S): 75 TABLET, FILM COATED ORAL at 12:01

## 2023-07-05 RX ADMIN — SODIUM CHLORIDE 150 MILLILITER(S): 9 INJECTION INTRAMUSCULAR; INTRAVENOUS; SUBCUTANEOUS at 05:34

## 2023-07-05 RX ADMIN — SODIUM CHLORIDE 150 MILLILITER(S): 9 INJECTION INTRAMUSCULAR; INTRAVENOUS; SUBCUTANEOUS at 13:30

## 2023-07-05 NOTE — ED CDU PROVIDER SUBSEQUENT DAY NOTE - PROGRESS NOTE DETAILS
Pt continue to have no pain. lt eye blurriness resolved as dilation medication from ophthalmology exam like worn off but with persistent rt eye blurriness. No new/acute events will continue to monitor.

## 2023-07-05 NOTE — ED CDU PROVIDER SUBSEQUENT DAY NOTE - ATTENDING APP SHARED VISIT CONTRIBUTION OF CARE
I personally saw the patient with the PA and performed a substantive portion of the visit including all aspects of the medical decision making.   Attending - Agree with above.  I evaluated patient myself. 63-year-old female who presented to ER yesterday after developing right eye blurry vision.  Stroke code in ED.  CT showed left occipital hypodensity.  Also concern for CRAO.  Neurology and ophthalmology consults performed and I reviewed their notes.  Patient currently reports feeling well.  Still having right eye blurry vision which is unchanged.  History done via [employer]  [id number].  MRI and echo pending.  ATTENDING PHYSICAL EXAM  GEN - NAD; well appearing; A+O x3  HEAD - NC/AT; EYES/NOSE - PERRL, EOMI, mucous membranes moist, no discharge; THROAT: Oral cavity and pharynx normal. No inflammation, swelling, exudate, or lesions  NECK: Neck supple, non-tender without lymphadenopathy, no masses, no JVD  PULMONARY - CTA b/l, symmetric breath sounds, no w/r/r  CARDIAC -s1s2, RRR, no M,R,G  ABDOMEN - +NABS, ND, NT, soft, no guarding, no rebound, no masses   BACK - no CVA tenderness, No vertebral or paravertebral tenderness  EXTREMITIES - symmetric pulses, 2+ dp, capillary refill < 2 seconds, no clubbing, no cyanosis, no edema  SKIN - no rash or bruising   NEUROLOGIC - alert, able to see my fingers with right eye but are not clear.  All other CNs intact.  No extremity motor or sensory deficits appreciated.

## 2023-07-05 NOTE — ED CDU PROVIDER DISPOSITION NOTE - NSFOLLOWUPINSTRUCTIONS_ED_ALL_ED_FT
Outpatient Follow-up: Patient should follow-up with his/her ophthalmologist or with [facility] Department of Ophthalmology within 1 week of after discharge at:    [address]  [place] [address]  Call [phone] to make and appointment    If you have issues obtaining follow up, please call: [phone] to obtain a doctor or specialist who takes your insurance in your area.     patient will need outpatient follow up for surgical planning with retina and cornea for PPV and sutured IOL.    Follow up with your Primary Medical Doctor within 2-3days. If results or reports were given to you, show copies of your reports given to you. Take all of your medications as previously prescribed.    IF any new, concerning, worsening symptoms return to the ER immediately.

## 2023-07-05 NOTE — ED CDU PROVIDER DISPOSITION NOTE - PATIENT PORTAL LINK FT
You can access the FollowMyHealth Patient Portal offered by [facility] by registering at the following website: [email or web address]. By joining [employer]’s FollowMyHealth portal, you will also be able to view your health information using other applications (apps) compatible with our system.

## 2023-07-05 NOTE — ED CDU PROVIDER SUBSEQUENT DAY NOTE - NS ED ATTENDING STATEMENT MOD
This was a shared visit with the GENO. I reviewed and verified the documentation and independently performed the documented:

## 2023-07-05 NOTE — ED CDU PROVIDER DISPOSITION NOTE - ATTENDING APP SHARED VISIT CONTRIBUTION OF CARE
Attending Contribution to Care: I personally saw the patient with the PA and performed a substantive portion of the visit including all aspects of the medical decision making.     Attending - Agree with above.  I evaluated patient myself in CDU. 63-year-old female who presented to ER yesterday after developing right eye blurry vision.  Stroke code in ED.  CT showed left occipital hypodensity.  Also concern for CRAO.  Neurology and ophthalmology consults performed and I reviewed their notes.  Patient currently reports feeling well.  Still having right eye blurry vision which is unchanged.  All test results reviewed.  MRI without evidence of CVA.  Ophthalmology assessment appreciated, likely lens dislocation, no CRAO.  Patient is stable for discharge will outpatient follow-up as discussed.  ATTENDING PHYSICAL EXAM  GEN - NAD; well appearing; A+O x3  HEAD - NC/AT; EYES/NOSE - PERRL, EOMI, mucous membranes moist, no discharge; THROAT: Oral cavity and pharynx normal. No inflammation, swelling, exudate, or lesions  NECK: Neck supple, non-tender without lymphadenopathy, no masses, no JVD  PULMONARY - CTA b/l, symmetric breath sounds, no w/r/r  CARDIAC -s1s2, RRR, no M,R,G  ABDOMEN - +NABS, ND, NT, soft, no guarding, no rebound, no masses   BACK - no CVA tenderness, No vertebral or paravertebral tenderness  EXTREMITIES - symmetric pulses, 2+ dp, capillary refill < 2 seconds, no clubbing, no cyanosis, no edema  SKIN - no rash or bruising   NEUROLOGIC - alert, able to see my fingers with right eye but are not clear.  All other CNs intact.  No extremity motor or sensory deficits appreciated.

## 2023-07-05 NOTE — ED CDU PROVIDER SUBSEQUENT DAY NOTE - PHYSICAL EXAMINATION
GEN: Awake, AOx3, NAD.  HEENT: NCAT  ---EYES: no scleral icterus, EOMI, b/l pupil dilation  CARDIO: RRR. Normal S1/S2, no murmur, rubs, or gallops  RESP: CTAB,   MSK: No obvious deformity or ROM deficit. 2+ pulses x4. No edema.  SKIN: Warm, dry. No rashes. Nail beds without cyanosis or clubbing.  NEURO:  - CN II-IV, VI: VA grossly intact but with b/l blurry vision rt> lt  - CN V: Sensation Intact in b/l V1-V3 distribution; masseter strength intact & symmetric b/l  - CN VII: Symmetric Brow Raise & Smile  - CN VIII: hearing grossly intact  - CN IX-XII: Shoulder shrug symmetric b/l (5/5); Tongue & Uvula Midline  - MOTOR: Strength 5/5 at all major joints in upper & lower extremities  - SENSORY  --- Sensation grossly intact x4 extremities  PSYCH: Appropriate mood & affect.

## 2023-07-05 NOTE — ED CDU PROVIDER SUBSEQUENT DAY NOTE - HISTORY
63-year-old Pankaj speaking female who was recently told she has elevated cholesterol and lipid levels not on medications presenting with right eye blurriness 30 minutes prior to ER visit, code stroke was activated and was seen by neurology as well as ophthalmology.  CT head showing hypodensity involving the cortex at the parasagittal left occipital lobe concerning for acute infarct.  Patient also with incidental finding on CTA neck of right palatine tonsil with ipsilateral level 2 adenopathy.  Initially neuro recommended CDU for MRI but after evaluation by ophthalmology given concern for possible early retinal occlusion there was a plan to possibly transfer patient to [place], however speaking to neurosurgery team at [place] patient was not a candidate therefore will be kept here for MRI brain and MRI orbits with and without contrast.  Patient started on aspirin and Plavix.  Patient also ordered for atorvastatin.  Patient also ordered bubble study TTE.  While at bedside patient with no acute complaints other than persistent blurry vision that is unchanged.  When I had seen the patient ophthalmology had dilated both pupils so patient had bilateral blurry vision but otherwise no acute complaints.  Denies headache dizziness paresthesias confusion slurred speech tinnitus change in gait weakness.  Patient's son at bedside who also states mother appears to be at her baseline health other than complaining of change in vision.  At this time no acute events patient remains stable.  Vital signs stable no acute telemetry events.  We will continue to monitor.

## 2023-07-05 NOTE — PROGRESS NOTE ADULT - ASSESSMENT
63y female with no known past medical history or ocular history consulted for evaluation of sudden onset blurry vision right eye x3 hours, found to have dislocated IOL right eye.    1) Dislocated IOL OD  - Va CF at 5' today however improves to 20/100 with +3.00 readers (aphakia will typically need around +10 lens to see clearly)  - PERRL no rAPD  - CVF with temporal constriction OD, grossly full OS  - DFE without disc margin blurring. no retinal pallor noted, no concern for CRAO in this patient based on retinal examiation  - Stroke team noted hypodensity in left occipital lobe concerning for occipital stroke  - GCA ROS negative, temporal pulses full; ESR/CRP normal - no suspicion for GCA  - Given CT findings, there is concern for possible ischemic event in occipital lobe, however retinal examination today overall appears normal  - decreased vision likely mostly related to IOL dislocation  - patient will need outpatient follow up for surgical planning with retina and cornea for PPV and sutured IOL  - Appreciate neurology care - follow up on MRI done this afternoon  - Agree with MRI brain w/ IV contrast, please add on MRI orbits w/ contrast to rule out orbital pathology - call ophthalmology if any orbital pathology - if MRI is overall negative, okay to d/c from ophthalmology perspective for outpatient follow up    #Ocular HTN/Glc Suspect  - IOP 21 OD, 24 OS  - CDR 0.55, 0.3 - asymmetric appearing  - Iris also bowed forward  - Will need further glaucoma screening as an outpatient     Seen and discussed with Dr. Marcos, attending.      Outpatient Follow-up: Patient should follow-up with his/her ophthalmologist or with [facility] Department of Ophthalmology within 1 week of after discharge at:    [address]  [place] [address]  [phone]    Guille Clark MD, PGY3  Also available on [employer] Teams

## 2023-07-05 NOTE — ED CDU PROVIDER DISPOSITION NOTE - CLINICAL COURSE
63-year-old Pankaj speaking female translation provided by son Nader   Presented to  ER with right eye blurriness 30 minutes prior to ER visit, code stroke was activated and was seen by neurology as well as ophthalmology.  CT head showing hypodensity involving the cortex at the parasagittal left occipital lobe concerning for acute infarct.  Patient also with incidental finding on CTA neck of right palatine tonsil with ipsilateral level 2 adenopathy.  Initially neuro recommended CDU for MRI but after evaluation by ophthalmology given concern for possible early retinal occlusion there was a plan to possibly transfer patient to [place], however speaking to neurosurgery team at [place] patient was not a candidate therefore was kept for MRI brain and MRI orbits with and without contrast.  MRI results showing  ORBITS:   Unremarkable MR of the orbits BRAIN:  Ischemic white matter disease and atrophy typical for age. No evidence of infarction. No abnormal enhancement. Patient started on aspirin and Plavix.  Patient also ordered for atorvastatin.  Patient also ordered bubble study TTE results wnl, no PFO. Discussed results with Neurology, who discussed findings and plan of discharge with attg. Patient cleared for dc, no indication for asa or plavix given patient Lens dislocation. Patient can follow up with Opthalmology, as per optho  decreased vision likely mostly related to IOL dislocation patient will need outpatient follow up for surgical planning with retina and cornea for PPV and sutured IOL. Patient was seen and cleared by PT. discussed all plan and findings with patient,  and son at bedside ok with plan for discharge with optho f/u. Patient is clinically well appearing and vitally stable for dc.

## 2023-07-05 NOTE — ED CDU PROVIDER SUBSEQUENT DAY NOTE - CLINICAL SUMMARY MEDICAL DECISION MAKING FREE TEXT BOX
63-year-old Pankaj speaking female who was recently told she has elevated cholesterol and lipid levels not on medications presenting with right eye blurriness 30 minutes prior to ER visit, code stroke was activated and was seen by neurology as well as ophthalmology.      CT head showing hypodensity involving the cortex at the parasagittal left occipital lobe concerning for acute infarct.  Patient also with incidental finding on CTA neck of right palatine tonsil with ipsilateral level 2 adenopathy.      Initially neuro recommended CDU for MRI but after evaluation by ophthalmology given concern for possible early retinal occlusion there was a plan to possibly transfer patient to [place], however speaking to neurosurgery team at [place] patient was not a candidate therefore will be kept here for MRI brain and MRI orbits with and without contrast.      Plan: MRI brain, orbits, TTE with bubble study, ASA, Plavix, atorvastatin. Neurology and opthalmology to follow.

## 2023-07-05 NOTE — PROGRESS NOTE ADULT - SUBJECTIVE AND OBJECTIVE BOX
[facility] DEPARTMENT OF OPHTHALMOLOGY  ------------------------------------------------------------------------------  Guille Clark MD, PGY3  Also available on [employer] Teams  ------------------------------------------------------------------------------    Interval History: No acute events overnight. Continues to have blurry vision in the right eye.    MEDICATIONS  (STANDING):  aspirin enteric coated 81 milliGRAM(s) Oral daily  atorvastatin 80 milliGRAM(s) Oral at bedtime  clopidogrel Tablet 75 milliGRAM(s) Oral daily  sodium chloride 0.9%. 1000 milliLiter(s) (150 mL/Hr) IV Continuous <Continuous>    MEDICATIONS  (PRN):      VITALS: T(C): 36.8 (07-05-23 @ 18:36)  T(F): 98.2 (07-05-23 @ 18:36), Max: 98.2 (07-05-23 @ 14:09)  HR: 68 (07-05-23 @ 18:36) (67 - 92)  BP: 151/76 (07-05-23 @ 18:36) (126/61 - 155/86)  RR:  (16 - 18)  SpO2:  (97% - 100%)  Wt(kg): --  AAOx4, Pankaj speaking      Ophthalmology Exam:  Visual acuity (sc): CF 5 ft improves to 20/100 with +3.00 readers OD 20/20 OS  Pupils: PERRL OU, no APD  Tonometry:  21 OD, 25 OS  Extraocular movements (EOMs): Full OU, no pain, no diplopia.  Confrontational Visual Field (CVF): OD: Temporal field constriction. Full nasally. OS: Full    Pen Light Exam (PLE)  External: Normal OU.  Lids/Lashes/Lacrimal Ducts: Flat OU.  Sclera/Conjunctiva: White and quiet OU.  Cornea: Clear OU.  Anterior Chamber: Deep and formed OU.    Iris: Bowed iris OU.  Lens: dislocated IOL OD wnl OS    Fundus Exam: dilated with 1% tropicamide and 2.5% phenylephrine  Approval obtained from primary team for dilation  Patient aware that pupils can remained dilated for at least 4-6 hours.  Exam performed with 20 D lens    Vitreous: IOL in inferior vitreous OD, wnl OS  Disc, cup/disc: sharp and pink, 0.55 OD, 0.3 OS  Macula. OD: retinal pallor. OS: wnl   Vessels: wnl OU  Periphery: wnl OU   [facility] DEPARTMENT OF OPHTHALMOLOGY  ------------------------------------------------------------------------------  Guille Clark MD, PGY3  Also available on [employer] Teams  ------------------------------------------------------------------------------    Interval History: No acute events overnight. Continues to have blurry vision in the right eye.    MEDICATIONS  (STANDING):  aspirin enteric coated 81 milliGRAM(s) Oral daily  atorvastatin 80 milliGRAM(s) Oral at bedtime  clopidogrel Tablet 75 milliGRAM(s) Oral daily  sodium chloride 0.9%. 1000 milliLiter(s) (150 mL/Hr) IV Continuous <Continuous>    MEDICATIONS  (PRN):      VITALS: T(C): 36.8 (07-05-23 @ 18:36)  T(F): 98.2 (07-05-23 @ 18:36), Max: 98.2 (07-05-23 @ 14:09)  HR: 68 (07-05-23 @ 18:36) (67 - 92)  BP: 151/76 (07-05-23 @ 18:36) (126/61 - 155/86)  RR:  (16 - 18)  SpO2:  (97% - 100%)  Wt(kg): --  AAOx4, Pankaj speaking      Ophthalmology Exam:  Visual acuity (sc): CF 5 ft improves to 20/100 with +3.00 readers OD 20/20 OS  Pupils: PERRL OU, no APD  Tonometry:  21 OD, 25 OS  Extraocular movements (EOMs): Full OU, no pain, no diplopia.  Confrontational Visual Field (CVF): OD: Temporal field constriction. Full nasally. OS: Full    Pen Light Exam (PLE)  External: Normal OU.  Lids/Lashes/Lacrimal Ducts: Flat OU.  Sclera/Conjunctiva: White and quiet OU.  Cornea: Clear OU.  Anterior Chamber: Deep and formed OU.    Iris: Bowed iris OU.  Lens: dislocated IOL OD wnl OS    Fundus Exam: dilated with 1% tropicamide and 2.5% phenylephrine  Approval obtained from primary team for dilation  Patient aware that pupils can remained dilated for at least 4-6 hours.  Exam performed with 20 D lens    Vitreous: IOL in inferior vitreous OD, wnl OS  Disc, cup/disc: sharp and pink, 0.55 OD, 0.3 OS  Macula. OD: retinal pallor. OS: wnl   Vessels: wnl OU  Periphery: wnl OU    < from: CT Brain Perfusion Maps Stroke (07.04.23 @ 17:10) >  IMPRESSION:  CT brain perfusion:  No evidence of acute infarct or ischemia.    CTA neck:  No stenosis. No dissection.  Heterogeneously enhancing enlarged right palatine tonsil with ipsilateral   level II adenopathy. In the absence of clinical evidence for an   infectious etiology, recommend visual inspection to exclude neoplasm.    CTA brain:  No stenosis or aneurysm.    < end of copied text >    < from: CT Brain Stroke Protocol (07.04.23 @ 17:09) >  IMPRESSION:  There appears to be hypodensity involving the cortex at the parasagittal   left occipital lobe raising concern for possible acute infarct.    < end of copied text >

## 2023-07-05 NOTE — PHYSICAL THERAPY INITIAL EVALUATION ADULT - ADDITIONAL COMMENTS
Pt lives with her son and his family in a house with 4 steps to enter and resides on the main floor. Prior to admission pt reports ambulating independently without a device and was independent in ADLs.    Following evaluation, pt was left semireclined in bed in no distress, call bell in reach.

## 2023-07-05 NOTE — PHYSICAL THERAPY INITIAL EVALUATION ADULT - PERTINENT HX OF CURRENT PROBLEM, REHAB EVAL
63 year old female presenting as code stroke for right eye blurry vision. CT head showing hypodensity involving the cortex at the parasagittal left occipital lobe concerning for acute infarct.

## 2023-07-06 PROBLEM — G43.909 MIGRAINE, UNSPECIFIED, NOT INTRACTABLE, WITHOUT STATUS MIGRAINOSUS: Chronic | Status: ACTIVE | Noted: 2023-07-04

## 2023-07-10 NOTE — ED CDU PROVIDER INITIAL DAY NOTE - OBJECTIVE STATEMENT
Melinda PACE: 63F no reported pmh presents with a cc of sudden onset painless L vision loss/blurry vision about 45min prior to arrival, has hx of migrane but no HA, nausea, vomiting, no fever, rash. Denies numbness, tingling or loss of sensation. Denies loss of motor function. No CP SOB or MATT. Denies n/v/f/c/cp/sob. Denies headache, syncope, lightheadedness, dizziness. Denies chest palpitations, abdominal pain. Denies edema. Denies dysuria,, diarrhea,     All other ROS negative, except as above and as per HPI and ROS section.    VITALS: Initial triage and subsequent vitals have been reviewed by me.  GEN APPEARANCE: Alert, non-toxic, well-appearing, NAD.  HEAD: Atraumatic.  EYES: PERRLa, EOMI, vision grossly intact, eye exam as above   NECK: Supple  CV: RRR, S1S2, no c/r/m/g. Cap refill <2 seconds. No bruits.   LUNGS: CTAB. No abnormal breath sounds.  ABDOMEN: Soft, NTND. No guarding or rebound.   MSK/EXT: No spinal or extremity point tenderness. No CVA ttp. Pelvis stable. No peripheral edema.  NEURO: Alert, follows commands. Weight bearing normal. Speech normal. Sensation and motor normal x4 extremities. CN2-12 normal, coordination normal, ambulating normally. UE & LE 5/5 b/l.  SKIN: Warm, dry and intact. No rash.  PSYCH: Appropriate

## 2023-07-10 NOTE — ED CDU PROVIDER INITIAL DAY NOTE - CLINICAL SUMMARY MEDICAL DECISION MAKING FREE TEXT BOX
Plan/MDM: exam vss non toxic PE as above ddx c/f acute cva consider crao or occipital, code stroke called in triage, will get code stroke bundle, neuro consult, consider optho consult however have less c/f primary eye pathology at this time, presentation not c/w acute glaucoma, no trauma reported.    Seen by enzo c/f cva/crao, plan for cdu for mri, echo, optho eval, neuro checks, reassesss.

## 2023-07-28 ENCOUNTER — APPOINTMENT (OUTPATIENT)
Dept: OPHTHALMOLOGY | Facility: CLINIC | Age: 63
End: 2023-07-28

## 2023-09-13 NOTE — ED ADULT NURSE NOTE - CHIEF COMPLAINT QUOTE
Pt. c/o right eye blurry vision starting around 4pm. Denies headache, dizziness, nausea, vomiting, weakness or eye pain. No other neuro deficits noted on exam. h/o migraines but never had blurry vision before. Crescentic Advancement Flap Text: The defect edges were debeveled with a #15 scalpel blade.  Given the location of the defect and the proximity to free margins a crescentic advancement flap was deemed most appropriate.  Using a sterile surgical marker, the appropriate advancement flap was drawn incorporating the defect and placing the expected incisions within the relaxed skin tension lines where possible.    The area thus outlined was incised deep to adipose tissue with a #15 scalpel blade.  The skin margins were undermined to an appropriate distance in all directions utilizing iris scissors.

## 2024-03-18 ENCOUNTER — APPOINTMENT (OUTPATIENT)
Dept: OPHTHALMOLOGY | Facility: CLINIC | Age: 64
End: 2024-03-18

## 2024-07-12 ENCOUNTER — NON-APPOINTMENT (OUTPATIENT)
Age: 64
End: 2024-07-12

## 2024-08-30 ENCOUNTER — NON-APPOINTMENT (OUTPATIENT)
Age: 64
End: 2024-08-30

## 2024-09-17 ENCOUNTER — APPOINTMENT (OUTPATIENT)
Dept: ORTHOPEDIC SURGERY | Facility: CLINIC | Age: 64
End: 2024-09-17